# Patient Record
Sex: FEMALE | Race: BLACK OR AFRICAN AMERICAN | HISPANIC OR LATINO | Employment: FULL TIME | URBAN - METROPOLITAN AREA
[De-identification: names, ages, dates, MRNs, and addresses within clinical notes are randomized per-mention and may not be internally consistent; named-entity substitution may affect disease eponyms.]

---

## 2019-01-01 ENCOUNTER — COMMUNICATION - HEALTHEAST (OUTPATIENT)
Dept: SCHEDULING | Facility: CLINIC | Age: 29
End: 2019-01-01

## 2020-01-09 PROCEDURE — 99285 EMERGENCY DEPT VISIT HI MDM: CPT | Mod: 25

## 2020-01-09 PROCEDURE — 96374 THER/PROPH/DIAG INJ IV PUSH: CPT

## 2020-01-09 RX ORDER — PRENATAL VIT/IRON FUM/FOLIC AC 27MG-0.8MG
1 TABLET ORAL DAILY
COMMUNITY

## 2020-01-10 ENCOUNTER — APPOINTMENT (OUTPATIENT)
Dept: ULTRASOUND IMAGING | Facility: CLINIC | Age: 30
End: 2020-01-10
Attending: EMERGENCY MEDICINE
Payer: COMMERCIAL

## 2020-01-10 ENCOUNTER — HOSPITAL ENCOUNTER (EMERGENCY)
Facility: CLINIC | Age: 30
Discharge: HOME | End: 2020-01-10
Attending: EMERGENCY MEDICINE | Admitting: EMERGENCY MEDICINE
Payer: COMMERCIAL

## 2020-01-10 VITALS
OXYGEN SATURATION: 100 % | RESPIRATION RATE: 16 BRPM | WEIGHT: 159.61 LBS | TEMPERATURE: 100.3 F | HEART RATE: 60 BPM | SYSTOLIC BLOOD PRESSURE: 131 MMHG | DIASTOLIC BLOOD PRESSURE: 86 MMHG

## 2020-01-10 DIAGNOSIS — B96.89 BACTERIAL VAGINOSIS: ICD-10-CM

## 2020-01-10 DIAGNOSIS — N76.0 BACTERIAL VAGINOSIS: ICD-10-CM

## 2020-01-10 DIAGNOSIS — R10.30 LOWER ABDOMINAL PAIN: ICD-10-CM

## 2020-01-10 DIAGNOSIS — O20.9 BLEEDING IN EARLY PREGNANCY: ICD-10-CM

## 2020-01-10 DIAGNOSIS — J06.9 UPPER RESPIRATORY TRACT INFECTION, UNSPECIFIED TYPE: ICD-10-CM

## 2020-01-10 LAB
ABO + RH BLD: NORMAL
ABO + RH BLD: NORMAL
ALBUMIN UR-MCNC: 30 MG/DL
APPEARANCE UR: CLEAR
B-HCG SERPL-ACNC: 383 IU/L (ref 0–5)
BASOPHILS # BLD AUTO: 0.1 10E9/L (ref 0–0.2)
BASOPHILS NFR BLD AUTO: 0.6 %
BILIRUB UR QL STRIP: NEGATIVE
COLOR UR AUTO: YELLOW
DIFFERENTIAL METHOD BLD: ABNORMAL
EOSINOPHIL # BLD AUTO: 0.4 10E9/L (ref 0–0.7)
EOSINOPHIL NFR BLD AUTO: 2.7 %
ERYTHROCYTE [DISTWIDTH] IN BLOOD BY AUTOMATED COUNT: 13 % (ref 10–15)
GLUCOSE UR STRIP-MCNC: NEGATIVE MG/DL
HCT VFR BLD AUTO: 37.4 % (ref 35–47)
HGB BLD-MCNC: 12.6 G/DL (ref 11.7–15.7)
HGB UR QL STRIP: ABNORMAL
IMM GRANULOCYTES # BLD: 0 10E9/L (ref 0–0.4)
IMM GRANULOCYTES NFR BLD: 0.2 %
KETONES UR STRIP-MCNC: 60 MG/DL
LEUKOCYTE ESTERASE UR QL STRIP: ABNORMAL
LYMPHOCYTES # BLD AUTO: 2.5 10E9/L (ref 0.8–5.3)
LYMPHOCYTES NFR BLD AUTO: 19.3 %
MCH RBC QN AUTO: 30.6 PG (ref 26.5–33)
MCHC RBC AUTO-ENTMCNC: 33.7 G/DL (ref 31.5–36.5)
MCV RBC AUTO: 91 FL (ref 78–100)
MONOCYTES # BLD AUTO: 0.6 10E9/L (ref 0–1.3)
MONOCYTES NFR BLD AUTO: 4.7 %
MUCOUS THREADS #/AREA URNS LPF: PRESENT /LPF
NEUTROPHILS # BLD AUTO: 9.5 10E9/L (ref 1.6–8.3)
NEUTROPHILS NFR BLD AUTO: 72.5 %
NITRATE UR QL: NEGATIVE
NRBC # BLD AUTO: 0 10*3/UL
NRBC BLD AUTO-RTO: 0 /100
PH UR STRIP: 6 PH (ref 5–7)
PLATELET # BLD AUTO: 275 10E9/L (ref 150–450)
RBC # BLD AUTO: 4.12 10E12/L (ref 3.8–5.2)
RBC #/AREA URNS AUTO: 7 /HPF (ref 0–2)
SOURCE: ABNORMAL
SP GR UR STRIP: 1.03 (ref 1–1.03)
SPECIMEN EXP DATE BLD: NORMAL
SPECIMEN SOURCE: ABNORMAL
SQUAMOUS #/AREA URNS AUTO: 6 /HPF (ref 0–1)
UROBILINOGEN UR STRIP-MCNC: 2 MG/DL (ref 0–2)
WBC # BLD AUTO: 13.1 10E9/L (ref 4–11)
WBC #/AREA URNS AUTO: 5 /HPF (ref 0–5)
WET PREP SPEC: ABNORMAL

## 2020-01-10 PROCEDURE — 81001 URINALYSIS AUTO W/SCOPE: CPT | Performed by: EMERGENCY MEDICINE

## 2020-01-10 PROCEDURE — 76817 TRANSVAGINAL US OBSTETRIC: CPT

## 2020-01-10 PROCEDURE — 96374 THER/PROPH/DIAG INJ IV PUSH: CPT

## 2020-01-10 PROCEDURE — 87491 CHLMYD TRACH DNA AMP PROBE: CPT | Performed by: EMERGENCY MEDICINE

## 2020-01-10 PROCEDURE — 85025 COMPLETE CBC W/AUTO DIFF WBC: CPT | Performed by: EMERGENCY MEDICINE

## 2020-01-10 PROCEDURE — 87210 SMEAR WET MOUNT SALINE/INK: CPT | Performed by: EMERGENCY MEDICINE

## 2020-01-10 PROCEDURE — 25000128 H RX IP 250 OP 636

## 2020-01-10 PROCEDURE — 84702 CHORIONIC GONADOTROPIN TEST: CPT | Performed by: EMERGENCY MEDICINE

## 2020-01-10 PROCEDURE — 86901 BLOOD TYPING SEROLOGIC RH(D): CPT | Performed by: EMERGENCY MEDICINE

## 2020-01-10 PROCEDURE — 25000132 ZZH RX MED GY IP 250 OP 250 PS 637: Performed by: EMERGENCY MEDICINE

## 2020-01-10 PROCEDURE — 25000128 H RX IP 250 OP 636: Performed by: EMERGENCY MEDICINE

## 2020-01-10 PROCEDURE — 87591 N.GONORRHOEAE DNA AMP PROB: CPT | Performed by: EMERGENCY MEDICINE

## 2020-01-10 RX ORDER — METRONIDAZOLE 500 MG/1
500 TABLET ORAL 2 TIMES DAILY
Qty: 14 TABLET | Refills: 0 | Status: SHIPPED | OUTPATIENT
Start: 2020-01-10 | End: 2020-02-14

## 2020-01-10 RX ORDER — ONDANSETRON 2 MG/ML
INJECTION INTRAMUSCULAR; INTRAVENOUS
Status: COMPLETED
Start: 2020-01-10 | End: 2020-01-10

## 2020-01-10 RX ORDER — ONDANSETRON 2 MG/ML
4 INJECTION INTRAMUSCULAR; INTRAVENOUS ONCE
Status: COMPLETED | OUTPATIENT
Start: 2020-01-10 | End: 2020-01-10

## 2020-01-10 RX ORDER — ONDANSETRON 4 MG/1
4 TABLET, ORALLY DISINTEGRATING ORAL ONCE
Status: COMPLETED | OUTPATIENT
Start: 2020-01-10 | End: 2020-01-10

## 2020-01-10 RX ORDER — ACETAMINOPHEN 500 MG
1000 TABLET ORAL EVERY 4 HOURS PRN
Status: DISCONTINUED | OUTPATIENT
Start: 2020-01-10 | End: 2020-01-10 | Stop reason: HOSPADM

## 2020-01-10 RX ADMIN — ONDANSETRON HYDROCHLORIDE 4 MG: 2 INJECTION, SOLUTION INTRAMUSCULAR; INTRAVENOUS at 02:48

## 2020-01-10 RX ADMIN — ACETAMINOPHEN 1000 MG: 500 TABLET, FILM COATED ORAL at 01:43

## 2020-01-10 RX ADMIN — ONDANSETRON 4 MG: 4 TABLET, ORALLY DISINTEGRATING ORAL at 02:14

## 2020-01-10 RX ADMIN — ONDANSETRON 4 MG: 2 INJECTION INTRAMUSCULAR; INTRAVENOUS at 02:48

## 2020-01-10 ASSESSMENT — ENCOUNTER SYMPTOMS
DYSURIA: 0
DIFFICULTY URINATING: 0
FREQUENCY: 0
ABDOMINAL PAIN: 1

## 2020-01-10 NOTE — ED NOTES
Pt actively vomiting on arrival to the ED room. Requesting antiemetic prior to ultrasound. MD notified and verbal order given. Pt A&Ox4, denies further needs at this time. Updated on plan of care.

## 2020-01-10 NOTE — ED PROVIDER NOTES
Reevaluation Per Dr Hurt after US    US pelvis- IMPRESSION:  1. No products of conception are visualized in the endometrial cavity. In the setting of a positive pregnancy test, the differential diagnosis includes early intrauterine pregnancy, spontaneous , and ectopic pregnancy. Recommend correlation with   beta-hCG levels and follow-up ultrasound as clinically indicated.  2. A small amount of nonspecific free fluid in the pelvis.  3. Several small follicles are present in the periphery of the ovaries. This could be physiologic, but can also be seen polycystic ovarian syndrome.    Discussed results and need for follow up to have HCG repeated.    Disposition: home    (O20.9) Bleeding in early pregnancy  :     (N76.0,  B96.89) Bacterial vaginosis    (J06.9) Upper respiratory tract infection, unspecified type      (R10.30) Lower abdominal pain       Noam Bustillos MD  01/10/20 0324

## 2020-01-10 NOTE — ED AVS SNAPSHOT
[facility] Emergency Department  [address]  [facility] [address]  Phone:  [phone]  Fax:  [phone]                                    Andressa Whitt   MRN: [id number]    Department:  [facility] Emergency Department   Date of Visit:  1/9/2020           After Visit Summary Signature Page    I have received my discharge instructions, and my questions have been answered. I have discussed any challenges I see with this plan with the nurse or doctor.    ..........................................................................................................................................  Patient/Patient Representative Signature      ..........................................................................................................................................  Patient Representative Print Name and Relationship to Patient    ..................................................               ................................................  Date                                   Time    ..........................................................................................................................................  Reviewed by Signature/Title    ...................................................              ..............................................  Date                                               Time          22EPIC Rev 08/18

## 2020-01-10 NOTE — ED PROVIDER NOTES
History     Chief Complaint:  Vaginal Bleeding    HPI  Andressa Whitt is a 29 year old  2 week pregnant female who presents to the emergency department today for evaluation of vaginal bleeding and cramping. Her cramping was 10/10 a few hours ago but has now improved. The patient states her bleeding was similar to a period but without any blood clots. She has been ill with the flu over the last 5 days with a cough and congestion. She denies any urinary symptoms or any vaginal discharge.       Allergies:  No known drug allergies    Medications:    Pre-mo vitamins    Past Medical History:    Asthma    Past Surgical History:    orthopedic surgery  appendectomy    Family History:    History reviewed. No pertinent family history.     Social History:  The patient reports that she has never smoked. She has never used smokeless tobacco. She reports previous alcohol use. She reports that she does not use drugs.   PCP: No primary care provider on file.  Marital Status:       Review of Systems   Gastrointestinal: Positive for abdominal pain.   Genitourinary: Positive for vaginal bleeding. Negative for difficulty urinating, dysuria, frequency and vaginal discharge.   All other systems reviewed and are negative.      Physical Exam     Patient Vitals for the past 24 hrs:   BP Temp Temp src Pulse Heart Rate Resp SpO2 Weight   01/10/20 0115 -- -- -- -- -- -- 98 % --   01/10/20 0100 (!) 127/98 -- -- 66 -- -- 98 % --   01/10/20 0030 (!) 142/93 -- -- 61 -- -- 99 % --   20 2356 139/88 100.3  F (37.9  C) Temporal -- 69 18 93 % 72.4 kg (159 lb 9.8 oz)     Physical Exam  Vital signs and nursing notes reviewed.     Constitutional: laying on gurney appears comfortable  HENT: Oropharynx is clear and moist. No pharyngeal erythema.   Eyes: Conjunctivae are normal bilaterally. Pupils equal  Neck: normal range of motion  Cardiovascular: Normal rate, regular rhythm, normal heart sounds.   Pulmonary/Chest: Effort normal and breath  sounds normal. Lungs are clear with no respiratory distress.   Abdominal: Soft. Bowel sounds are normal. No rebound or guarding. Discomfort across the low abdomen to palpation no upper abdominal pain.   Pelvic Exam: External genitalia appears normal. Cervix is closed, there is a scant amount of blood noted in the vaginal vault. No tissue noted. No discharge.   Musculoskeletal: No joint swelling or edema.   Neurological: Alert and oriented. No focal weakness  Skin: Skin is warm and dry. No rash noted.   Psych: normal affect    Emergency Department Course     Imaging:  Radiology findings were communicated with the patient who voiced understanding of the findings.    OB US 1st trimestor  OB  US 1st trimester w transvag    (Results Pending)   Reading per radiology    Laboratory:  Laboratory findings were communicated with the patient who voiced understanding of the findings.    CBC: WBC 13.1 (H) o/w WNL. ( HGB 12.6, )     Wet Prep: Clue cells seen (A)  HCG Quantitative: 383 (H)  Rh type Rh positive  Chlamydia in progress  gonorrhea in progress    UA: urineketon 60 (H), blood large, leukocyte trace, RBC/HPF 7 (H), squamous 6 (H), mucus present, o/w Negative    Interventions:  0143 Tylenol 1000 mg PO  0214 Zofran 4mg PO    Emergency Department Course:  Past medical records, nursing notes, and vitals reviewed.  0036: I performed an exam of the patient and obtained history, as documented above.     IV was inserted and blood was drawn for laboratory testing, results above.  The patient provided a urine sample here in the emergency department. This was sent for laboratory testing, findings above.  The patient was sent for a US while in the emergency department, findings above.    I personally reviewed the laboratory/imaging results with the Patient and spouse and answered all related questions prior to sign out    0225: I rechecked the patient.  Findings and plan explained to the Patient and spouse. Per Dr. Bustillos  after results of the ultrasounds the patient will be discharged home with instructions regarding supportive care, medications, and reasons to return. The importance of close follow-up was reviewed.     Impression & Plan      Medical Decision Making:  Andressa Whitt is a 29 year old  2 weeks pregnant by date female who presents to the emergency department today for evaluation of lower abdominal cramping and vaginal bleeding in early pregnancy. It was noted that she did have a low grade temperature as well on arrival but admits that she has had flu like symptoms starting 5 days ago which are resolving. On exam she has no signs of significant upper respiratory symptoms or pneumonia. Pelvic exam revealed a closed cervix with a very scant amount of bleeding and no obvious past tissue noted. Initial labs test showed and HCG level of 383. Wet prep did show Clue cells and I plan to start the patient on Flagyl. White count is mildly elevated but this is likely due to an upper respiratory infection. Patient states she is approximately 2 weeks pregnant and it is unlikely we would see much with ultrasound but this is ordered and currently pending. I discussed the patient with my partner Dr. Bustillos who will follow up on the results and plan for disposition home. I discussed with the patient that ultrasound if it is not definitive especially this early in gestation it is important that she is aware that with pain and bleeding in early pregnancy that an ectopic pregnancy cannot be excluded and that she needs close follow up to follow her HCG levels. She was given a referral to OB/GYN or she can come back to the ED for further evaluation and recheck of this if she cannot get into clinic. The patient understands the plan and ending the ultrasound results will likely be able to be discharged. The patient is Rh positive and does not require Kulwant.     Diagnosis:    ICD-10-CM   1. Bleeding in early pregnancy O20.9   2. Bacterial  vaginosis N76.0    B96.89   3. Upper respiratory tract infection, unspecified type J06.9      4. Lower abdominal pain R10.30       Disposition:   patient was signed out to my colleague Dr. Bustillos who will follow up on US.       Discharge Medications:     Medication List      Started    metroNIDAZOLE 500 MG tablet  Commonly known as:  FLAGYL  500 mg, Oral, 2 TIMES DAILY            Scribe Disclosure:  Crystal GARCIA, am serving as a scribe at 12:36 AM on 1/10/2020 to document services personally performed by Ho Hurt MD based on my observations and the provider's statements to me.    [facility] EMERGENCY DEPARTMENT       Ho Hurt MD  01/13/20 1735

## 2020-01-10 NOTE — ED TRIAGE NOTES
Started bleeding vaginally PTA, just positive pregnancy test 2 days ago also c/o cramping ABC intact

## 2020-01-12 LAB
C TRACH DNA SPEC QL NAA+PROBE: NEGATIVE
N GONORRHOEA DNA SPEC QL NAA+PROBE: NEGATIVE
SPECIMEN SOURCE: NORMAL
SPECIMEN SOURCE: NORMAL

## 2020-01-12 NOTE — RESULT ENCOUNTER NOTE
Final result for both N. Gonorrhoeae PCR and Chlamydia Trachomatis PCR are NEGATIVE.  No treatment or change in treatment per [facility] ED Lab Result protocol.

## 2020-01-30 ENCOUNTER — APPOINTMENT (OUTPATIENT)
Dept: ULTRASOUND IMAGING | Facility: CLINIC | Age: 30
End: 2020-01-30
Attending: EMERGENCY MEDICINE
Payer: COMMERCIAL

## 2020-01-30 ENCOUNTER — HOSPITAL ENCOUNTER (EMERGENCY)
Facility: CLINIC | Age: 30
Discharge: HOME | End: 2020-01-30
Attending: EMERGENCY MEDICINE | Admitting: EMERGENCY MEDICINE
Payer: COMMERCIAL

## 2020-01-30 VITALS
SYSTOLIC BLOOD PRESSURE: 103 MMHG | HEART RATE: 73 BPM | BODY MASS INDEX: 28.3 KG/M2 | OXYGEN SATURATION: 99 % | DIASTOLIC BLOOD PRESSURE: 76 MMHG | RESPIRATION RATE: 20 BRPM | WEIGHT: 149.91 LBS | HEIGHT: 61 IN | TEMPERATURE: 96.9 F

## 2020-01-30 DIAGNOSIS — O00.90 ECTOPIC PREGNANCY, UNSPECIFIED LOCATION, UNSPECIFIED WHETHER INTRAUTERINE PREGNANCY PRESENT: ICD-10-CM

## 2020-01-30 LAB
ALBUMIN SERPL-MCNC: 4.1 G/DL (ref 3.4–5)
ALBUMIN UR-MCNC: NEGATIVE MG/DL
ALP SERPL-CCNC: 68 U/L (ref 40–150)
ALT SERPL W P-5'-P-CCNC: 23 U/L (ref 0–50)
ANION GAP SERPL CALCULATED.3IONS-SCNC: 3 MMOL/L (ref 3–14)
APPEARANCE UR: CLEAR
AST SERPL W P-5'-P-CCNC: 11 U/L (ref 0–45)
B-HCG SERPL-ACNC: 1857 IU/L (ref 0–5)
BASOPHILS # BLD AUTO: 0.1 10E9/L (ref 0–0.2)
BASOPHILS NFR BLD AUTO: 0.7 %
BILIRUB SERPL-MCNC: 0.5 MG/DL (ref 0.2–1.3)
BILIRUB UR QL STRIP: NEGATIVE
BUN SERPL-MCNC: 20 MG/DL (ref 7–30)
CALCIUM SERPL-MCNC: 9.2 MG/DL (ref 8.5–10.1)
CHLORIDE SERPL-SCNC: 104 MMOL/L (ref 94–109)
CO2 SERPL-SCNC: 30 MMOL/L (ref 20–32)
COLOR UR AUTO: ABNORMAL
CREAT SERPL-MCNC: 0.67 MG/DL (ref 0.52–1.04)
DIFFERENTIAL METHOD BLD: NORMAL
EOSINOPHIL # BLD AUTO: 0.1 10E9/L (ref 0–0.7)
EOSINOPHIL NFR BLD AUTO: 1.5 %
ERYTHROCYTE [DISTWIDTH] IN BLOOD BY AUTOMATED COUNT: 13.1 % (ref 10–15)
GFR SERPL CREATININE-BSD FRML MDRD: >90 ML/MIN/{1.73_M2}
GLUCOSE SERPL-MCNC: 91 MG/DL (ref 70–99)
GLUCOSE UR STRIP-MCNC: NEGATIVE MG/DL
HCT VFR BLD AUTO: 38.5 % (ref 35–47)
HGB BLD-MCNC: 12.6 G/DL (ref 11.7–15.7)
HGB UR QL STRIP: ABNORMAL
IMM GRANULOCYTES # BLD: 0 10E9/L (ref 0–0.4)
IMM GRANULOCYTES NFR BLD: 0.3 %
KETONES UR STRIP-MCNC: NEGATIVE MG/DL
LEUKOCYTE ESTERASE UR QL STRIP: NEGATIVE
LYMPHOCYTES # BLD AUTO: 2.4 10E9/L (ref 0.8–5.3)
LYMPHOCYTES NFR BLD AUTO: 32 %
MCH RBC QN AUTO: 30.1 PG (ref 26.5–33)
MCHC RBC AUTO-ENTMCNC: 32.7 G/DL (ref 31.5–36.5)
MCV RBC AUTO: 92 FL (ref 78–100)
MONOCYTES # BLD AUTO: 0.5 10E9/L (ref 0–1.3)
MONOCYTES NFR BLD AUTO: 6.5 %
MUCOUS THREADS #/AREA URNS LPF: PRESENT /LPF
NEUTROPHILS # BLD AUTO: 4.4 10E9/L (ref 1.6–8.3)
NEUTROPHILS NFR BLD AUTO: 59 %
NITRATE UR QL: NEGATIVE
NRBC # BLD AUTO: 0 10*3/UL
NRBC BLD AUTO-RTO: 0 /100
PH UR STRIP: 6 PH (ref 5–7)
PLATELET # BLD AUTO: 286 10E9/L (ref 150–450)
POTASSIUM SERPL-SCNC: 3.6 MMOL/L (ref 3.4–5.3)
PROT SERPL-MCNC: 7.8 G/DL (ref 6.8–8.8)
RADIOLOGIST FLAGS: ABNORMAL
RBC # BLD AUTO: 4.18 10E12/L (ref 3.8–5.2)
RBC #/AREA URNS AUTO: 60 /HPF (ref 0–2)
SODIUM SERPL-SCNC: 137 MMOL/L (ref 133–144)
SOURCE: ABNORMAL
SP GR UR STRIP: 1.03 (ref 1–1.03)
SQUAMOUS #/AREA URNS AUTO: 6 /HPF (ref 0–1)
UROBILINOGEN UR STRIP-MCNC: NORMAL MG/DL (ref 0–2)
WBC # BLD AUTO: 7.4 10E9/L (ref 4–11)
WBC #/AREA URNS AUTO: 3 /HPF (ref 0–5)

## 2020-01-30 PROCEDURE — 96401 CHEMO ANTI-NEOPL SQ/IM: CPT

## 2020-01-30 PROCEDURE — 96372 THER/PROPH/DIAG INJ SC/IM: CPT

## 2020-01-30 PROCEDURE — 76801 OB US < 14 WKS SINGLE FETUS: CPT

## 2020-01-30 PROCEDURE — 25000132 ZZH RX MED GY IP 250 OP 250 PS 637: Performed by: EMERGENCY MEDICINE

## 2020-01-30 PROCEDURE — 96361 HYDRATE IV INFUSION ADD-ON: CPT

## 2020-01-30 PROCEDURE — 80053 COMPREHEN METABOLIC PANEL: CPT | Performed by: EMERGENCY MEDICINE

## 2020-01-30 PROCEDURE — 85025 COMPLETE CBC W/AUTO DIFF WBC: CPT | Performed by: EMERGENCY MEDICINE

## 2020-01-30 PROCEDURE — 81001 URINALYSIS AUTO W/SCOPE: CPT | Performed by: EMERGENCY MEDICINE

## 2020-01-30 PROCEDURE — 99284 EMERGENCY DEPT VISIT MOD MDM: CPT | Mod: 25

## 2020-01-30 PROCEDURE — 25800030 ZZH RX IP 258 OP 636: Performed by: EMERGENCY MEDICINE

## 2020-01-30 PROCEDURE — 96374 THER/PROPH/DIAG INJ IV PUSH: CPT

## 2020-01-30 PROCEDURE — 25000128 H RX IP 250 OP 636: Performed by: EMERGENCY MEDICINE

## 2020-01-30 PROCEDURE — 84702 CHORIONIC GONADOTROPIN TEST: CPT | Performed by: EMERGENCY MEDICINE

## 2020-01-30 RX ORDER — METHOTREXATE 25 MG/ML
50 INJECTION INTRA-ARTERIAL; INTRAMUSCULAR; INTRATHECAL; INTRAVENOUS ONCE
Status: COMPLETED | OUTPATIENT
Start: 2020-01-30 | End: 2020-01-30

## 2020-01-30 RX ORDER — ONDANSETRON 4 MG/1
4 TABLET, ORALLY DISINTEGRATING ORAL EVERY 8 HOURS PRN
Qty: 10 TABLET | Refills: 0 | Status: SHIPPED | OUTPATIENT
Start: 2020-01-30 | End: 2020-02-14

## 2020-01-30 RX ORDER — ACETAMINOPHEN 325 MG/1
650 TABLET ORAL ONCE
Status: COMPLETED | OUTPATIENT
Start: 2020-01-30 | End: 2020-01-30

## 2020-01-30 RX ORDER — HYDROCODONE BITARTRATE AND ACETAMINOPHEN 5; 325 MG/1; MG/1
1 TABLET ORAL EVERY 6 HOURS PRN
Qty: 12 TABLET | Refills: 0 | Status: SHIPPED | OUTPATIENT
Start: 2020-01-30 | End: 2020-02-14

## 2020-01-30 RX ORDER — ONDANSETRON 2 MG/ML
4 INJECTION INTRAMUSCULAR; INTRAVENOUS ONCE
Status: COMPLETED | OUTPATIENT
Start: 2020-01-30 | End: 2020-01-30

## 2020-01-30 RX ORDER — POLYETHYLENE GLYCOL 3350 17 G/17G
1 POWDER, FOR SOLUTION ORAL DAILY
Qty: 527 G | Refills: 0 | Status: SHIPPED | OUTPATIENT
Start: 2020-01-30 | End: 2020-02-29

## 2020-01-30 RX ORDER — ASPIRIN 81 MG
100 TABLET, DELAYED RELEASE (ENTERIC COATED) ORAL DAILY
Qty: 10 TABLET | Refills: 0 | Status: SHIPPED | OUTPATIENT
Start: 2020-01-30

## 2020-01-30 RX ADMIN — METHOTREXATE 86 MG: 25 SOLUTION INTRA-ARTERIAL; INTRAMUSCULAR; INTRATHECAL; INTRAVENOUS at 19:30

## 2020-01-30 RX ADMIN — ONDANSETRON HYDROCHLORIDE 4 MG: 2 INJECTION, SOLUTION INTRAMUSCULAR; INTRAVENOUS at 14:37

## 2020-01-30 RX ADMIN — SODIUM CHLORIDE 500 ML: 9 INJECTION, SOLUTION INTRAVENOUS at 14:37

## 2020-01-30 RX ADMIN — ACETAMINOPHEN 650 MG: 325 TABLET, FILM COATED ORAL at 14:37

## 2020-01-30 ASSESSMENT — ENCOUNTER SYMPTOMS
CONSTIPATION: 1
VOMITING: 1
FEVER: 0
DYSURIA: 0
ABDOMINAL PAIN: 1

## 2020-01-30 ASSESSMENT — MIFFLIN-ST. JEOR: SCORE: 1342.76

## 2020-01-30 NOTE — H&P
2020      **PLEASE SEE DICTATED CONSULT NOTE FOR FURTHER DETAILS**    28yo  AAF @ approx 5.1 wga by LMP (19) presents to the ER for worsening RLQ/pelvic pain, spotting.  Of note, she has had pain for the past 2 weeks however today at work it got worse which prompted her visit.  She has had spotting as well.  She was evaluated in the ER on 1/10 and her bHCG at that time was 383.  Today her bHCG is 1857.  Pelvic US today shows no IUP, with ET of only ~ 3.5 mm, and a 3.9 cm complex cyst/mass in R adnexa adjacent to R ovary, with small amount of fluid in pelvis, suspicious for ectopic pregnancy.  The pt is otherwise hemodynamically stable, abdomen is soft with moderate ttp RLQ, no rebound, Hgb 12.6 with normal LFTs and BUN/Cr.  After discussion with pt, decision made to proceed with methotrexate 50 mg/m2 IM.  Stressed to pt importance of close follow-up with repeat bHCG on , and bHCG + LFTs + BUN/Cr on 20.  Risks/benefits/side effects of methotrexate were reviewed, as well as risk of worsening pain this weekend.  Reasons to call/return to ED, and activity restrictions reviewed.  All questions answered.  Pt does not have an OBGYN doctor, and she is welcome to follow-up with [facility] next week on .        LEVAR DURAN MD

## 2020-01-30 NOTE — ED PROVIDER NOTES
"  History     Chief Complaint:  Right Abdominal Pain; Vaginal bleeding    The history is provided by the patient and medical records.      Andressa Whitt is a 29 year old female  with a history of asthma who presents to the emergency department for evaluation of abdominal pain and vaginal bleeding. Patient's last menstrual cycle was over a month ago (19) and she had a positive pregnancy home test about four weeks ago.     Two weeks ago, the patient was seen in the ED for vaginal bleeding and abdominal cramping. She had blood work and an ultrasound done, see workup below. Since then, the patient's bleeding and right sided abdominal pain has not subsided. Patient states she is going through two pads a day but is not passing any clots and reports bleeding more as \"spotting.\" Today, the patient had to leave work early because her cramping/abdominal pain was so severe, which prompted her arrival today. She notes she has tried taking medication for her pain but throws it up \"immediately.\" She also complains of constipation with pain when passing stool.     Patient denies any fever, vaginal discharge, lightheadedness or dysuria. Patient notes she has her first OB appointment on 2020.    Work-up from 1/10/2020:  CBC: WBC 13.1 (H) o/w WNL. ( HGB 12.6, )      Wet Prep: Clue cells seen (A)  HCG Quantitative: 383 (H)  Rh type Rh positive  Chlamydia: Negative   Gonorrhea: Negative      UA: Ketones: 60 (H), Blood: Large, Leukocyte: Rrace, RBC: 7 (H), Squamous: 6 (H), Mucus: Present, o/w Negative    OB US 1st Trimester with transvaginal:  1. No products of conception are visualized in the endometrial cavity. In the setting of a positive pregnancy test, the differential diagnosis includes early intrauterine pregnancy, spontaneous , and ectopic pregnancy. Recommend correlation with  beta-hCG levels and follow-up ultrasound as clinically indicated.  2. A small amount of nonspecific free fluid in the " "pelvis.  3. Several small follicles are present in the periphery of the ovaries. This could be physiologic, but can also be seen polycystic ovarian syndrome. As per radiology     Allergies:  No Known Drug Allergies     Medications:    Pre- vitamins    Past Medical History:    Asthma    Past Surgical History:    Appendectomy  Orthopedic surgery     Family History:    No past pertinent family history.    Social History:  Negative for tobacco use.  Not currently drinking alcohol.  Negative for drug use.  Marital Status:        Review of Systems   Constitutional: Negative for fever.   Gastrointestinal: Positive for abdominal pain, constipation and vomiting.   Genitourinary: Positive for vaginal bleeding. Negative for dysuria and vaginal discharge.   All other systems reviewed and are negative.    Physical Exam     Patient Vitals for the past 24 hrs:   BP Temp Temp src Pulse Resp SpO2 Height Weight   20 1707 -- -- -- -- -- -- 1.55 m (5' 1.02\") 68 kg (149 lb 14.6 oz)   20 1409 116/86 96.9  F (36.1  C) Temporal 67 20 98 % -- --       Physical Exam  Nursing note and vitals reviewed.  Constitutional: Well nourished. Resting comfortably.   Eyes: Conjunctiva normal.  Pupils are equal, round, and reactive to light.   ENT: Nose normal. Mucous membranes pink and moist.    Neck: Normal range of motion.  CVS: Normal rate, regular rhythm.  Normal heart sounds.  No murmur.  Pulmonary: Lungs clear to auscultation bilaterally. No wheezes/rales/rhonchi.  GI: Abdomen soft. Mild suprapubic pain. No rigidity or guarding.  No CVA tendernss  Pelvic: deferred by patient  MSK: No calf tenderness or swelling.  Neuro: Alert. Follows simple commands.  Skin: Skin is warm and dry. No rash noted.   Psychiatric: Normal affect.     Emergency Department Course   Imaging:  Radiology findings were communicated with the patient and OB who voiced understanding of the findings.    US OB <14 Weeks with Transvaginal:  1. No " intrauterine pregnancy is identified.   2. A 3.9 cm right adnexal mass containing a central cystic region is considered suspicious for an ectopic pregnancy.  3. Small amount of complex free fluid in the pelvis is suspicious for the presence of blood products. As per radiology.     Laboratory:  Laboratory findings were communicated with the patient who voiced understanding of the findings.    CBC: AWNL; WBC: 7.4, HGB: 12.6, PLT: 286  CMP: WNL (Creatinine: 0.67)    HCG Quantitative Pregnancy: 1,857 (H)    UA with Microscopic: Blood: Moderate, RBC: 60 (H), Squamous Epithelial: 6 (H), Mucous: Present, o/w Negative    Interventions:  1437 NS 1L IV  1437 Tylenol 650 mg PO  1437 Zofran 4 mg IV  1930 Methotrexate 86 mg IM    Emergency Department Course:  Past medical records, nursing notes, and vitals reviewed.    1415 I performed an exam of the patient as documented above.     IV was inserted and blood was drawn for laboratory testing, results above.  The patient provided a urine sample here in the emergency department. This was sent for laboratory testing, findings above.  The patient was sent for a OB US while in the emergency department, results above.     1606 I consulted with Dr. Pitts, radiology, regarding the patient's history and presentation here in the emergency department.    1619 I consulted with Dr. Hernandez, OB, regarding the patient's history and presentation here in the emergency department.    1625 I rechecked the patient and discussed the results of her workup thus far.     1656 I consulted with Dr. Hernandez, OB, regarding the patient's history and presentation here in the emergency department.    Findings and plan explained to the Patient. Patient discharged home with instructions regarding supportive care, medications, and reasons to return. The importance of close follow-up was reviewed. The patient was prescribed Colace, Zofran, Miralax, and Hydrocodone.    I personally reviewed the laboratory and  imaging results with the Patient and answered all related questions prior to discharge.     Impression & Plan   Medical Decision Making:  Patient is a 29-year-old female presenting in early pregnancy with abdominal pain and vaginal bleeding.  She has nontoxic, clinically well hydrated on arrival.  She has no significant reproducible tenderness other than mild suprapubic tenderness.  She deferred formal pelvic exam today.  I reviewed patient's wet prep from last visit as well as her gonorrhea and chlamydia which were negative.  She denies any concerns for STIs today.  The patient is not a RhoGam candidate based on previous Rhogam evaluation.  Her hemoglobin is stable today and I doubt life-threatening bleeding.  UA without infection.  She did undergo a repeat pelvic ultrasound which suggest concern for ectopic pregnancy.  Patient was evaluated by OB team during her time in the ED who recommended methotrexate and consented patient for administration.  This was administered during her time in the ED.  Plans for repeat beta-hCG on Sunday and plans for repeat evaluation with lab work/OB/GYN follow-up on Wednesday.  Patient was counseled on return precautions to the ED including but not limited to bleeding greater than 1 pad an hour, lightheadedness, increasing abdominal pain.  Patient comfortable with plan of care at this time, all questions addressed.  Patient hemodynamically stable at time of discharge with no significant abdominal pain.  She will additionally be sent home with pain control, norco as well as stool softeners/miralax as patient expressed history of constipation.     Diagnosis:    ICD-10-CM    1. Ectopic pregnancy, unspecified location, unspecified whether intrauterine pregnancy present O00.90        Disposition:  Discharged to home.    Discharge Medications:  New Prescriptions    DOCUSATE SODIUM (COLACE) 100 MG TABLET    Take 1 tablet (100 mg) by mouth daily    HYDROCODONE-ACETAMINOPHEN (NORCO) 5-325 MG  TABLET    Take 1 tablet by mouth every 6 hours as needed for pain    ONDANSETRON (ZOFRAN ODT) 4 MG ODT TAB    Take 1 tablet (4 mg) by mouth every 8 hours as needed for nausea    POLYETHYLENE GLYCOL (MIRALAX) POWDER    Take 17 g (1 capful) by mouth daily       Scribe Disclosure:  I, Lety Pelayo, am serving as a scribe at 2:23 PM on 1/30/2020 to document services personally performed by Eileen Cintron DO, based on my observations and the provider's statements to me.        Eileen Cintron DO  01/30/20 1939

## 2020-01-30 NOTE — ED TRIAGE NOTES
+ pregnancy test 2 weeks ago.  LMP 12/25/19.  Right side abdominal pain and vaginal bleeding.  ABCDs intact.

## 2020-01-30 NOTE — ED AVS SNAPSHOT
[facility] Department  [address]  [facility] [address]  Phone:  [phone]  Fax:  [phone]                                    Andressa Whitt   MRN: [id number]    Department:  [facility] Department   Date of Visit:  1/30/2020           After Visit Summary Signature Page    I have received my discharge instructions, and my questions have been answered. I have discussed any challenges I see with this plan with the nurse or doctor.    ..........................................................................................................................................  Patient/Patient Representative Signature      ..........................................................................................................................................  Patient Representative Print Name and Relationship to Patient    ..................................................               ................................................  Date                                   Time    ..........................................................................................................................................  Reviewed by Signature/Title    ...................................................              ..............................................  Date                                               Time          22EPIC Rev 08/18

## 2020-01-31 ENCOUNTER — MEDICAL CORRESPONDENCE (OUTPATIENT)
Dept: HEALTH INFORMATION MANAGEMENT | Facility: CLINIC | Age: 30
End: 2020-01-31

## 2020-01-31 DIAGNOSIS — O00.90 ECTOPIC PREGNANCY: Primary | ICD-10-CM

## 2020-01-31 NOTE — CONSULTS
Consult Date:  01/30/2020      REASON FOR CONSULTATION:  Suspected ectopic pregnancy.      HISTORY OF PRESENT ILLNESS:  This is a 29-year-old G3, P1-0-1-1 -American female at approximately 5 weeks and 1 day gestation by LMP of 12/25/2019 who presented to the ER on the afternoon of 01/30/2020 with main complaint of right lower quadrant pelvic pain, as well as vaginal spotting.  Of note, she had been evaluated in the ER on 01/10/2019 for spotting and her beta hCG at that time was 383 with ultrasound showing no intrauterine pregnancy.  For the past 2 weeks since her prior evaluation, she had worsening right lower quadrant pain which prompted her return visit.  Upon arrival to the ER, her repeat beta hCG was found to be 1857 with a pelvic ultrasound showing no intrauterine pregnancy, endometrial thickness of only approximately 2 mm, and a 3.9 cm complex cystic mass in the right adnexa adjacent to the right ovary, suspicious for ectopic pregnancy.  There was only a small amount of complex fluid in the cul-de-sac.  The patient was otherwise hemodynamically stable with hemoglobin of 12.6 in the ER.        PAST MEDICAL HISTORY:  Denies.      PAST OB/GYN HISTORY:  The patient has 1 prior full-term vaginal delivery and 1 prior first trimester SAB.      PAST SURGICAL HISTORY:  Denies.      MEDICATIONS:  Please see MAR.      ALLERGIES:  NO KNOWN DRUG ALLERGIES.      SOCIAL HISTORY:  Denies smoking, alcohol or illicit drug use.      REVIEW OF SYSTEMS:  As per HPI, otherwise negative.      PHYSICAL EXAMINATION:   VITAL SIGNS:  Temperature 96.9, pulse 67, blood pressure 116/86, respiratory rate 20, O2 sat 98% on room air.   GENERAL:  No acute distress, alert and oriented x 3.   CARDIOVASCULAR:  Regular rate and rhythm without murmurs, rubs or gallops.   RESPIRATORY:  Clear to auscultation bilaterally without wheezes, rhonchi or rales.   ABDOMEN:  Soft.  There is mild tenderness to palpation in the right lower quadrant;  however, no guarding and no rebound.   EXTREMITIES:  No clubbing, cyanosis or edema.   GENITOURINARY:  Deferred.      LABORATORY DATA:  CBC revealed WBC 7.4, hemoglobin 12.6, hematocrit 38.5 and platelets of 286.  Metabolic panel revealed sodium of 137, potassium 3.6, chloride 104, carbon dioxide 30, BUN 20, creatinine 0.67, AST 11, ALT 23.  Serum quantitative beta hCG 1857.      IMAGING:  Pelvic ultrasound revealed no intrauterine gestational sac identified with no embryonic pole or cardiac activity.  The endometrial stripe measures 2 mm and within normal limits.  There is a 3.9 x 3.6 x 3.3 cm mass abutting the right ovary containing a central cystic lesion suspicious for ectopic pregnancy and a small amount of complex free fluid in the pelvis.      ASSESSMENT AND PLAN:  The patient is a 29-year-old -0-1-1 -American female at approximately 5 weeks and 1 day gestation by LMP of 2019 who presents to the ER with worsening right lower quadrant and pelvic pain and spotting.  Given abnormal rise in beta hCG from 383 to 1857 over the course of almost 3 weeks as well as pelvic ultrasound showing thin endometrium without evidence of intrauterine pregnancy, as well as a complex cystic mass in the right adnexa separate from the right ovary, this is highly suspicious for a right-sided tubal ectopic pregnancy. The patient is hemodynamically stable with normal hemoglobin and exam with only mild tenderness to palpation in the RLQ without rebound or guarding.  Ultrasound also revealed only a small amount of fluid in the cul-de-sac.  With a complex mass measuring less than 4 cm and a beta hCG of 1857, I believe that the patient is a good candidate for IM methotrexate therapy.  The risks, benefits and side effects of methotrexate were reviewed with the patient as well as the importance of close followup as an outpatient.  The risk of also worsening pain within 2-3 days after administration of methotrexate was also  reviewed with the patient.  The risk of methotrexate failure and/or ectopic rupture was also discussed, which may necessitate surgical management with right salpingectomy.  The patient understood these risks and desired to proceed with methotrexate.  All questions were answered.        Methotrexate 50 kg/m2 IM has been ordered by the ER physician.  The patient was instructed to return  to the outpatient lab for a repeat quantitative beta hCG level and to follow up in our office, [facility], on Wednesday, , for a repeat quantitative beta hCG level, LFTs and renal blood work as well as physician appointment.         LEVAR DURAN MD             D: 2020   T: 2020   MT:       Name:     JASWANT THOMAS   MRN:      [id number]        Account:       [id number]   :      1990           Consult Date:  2020      Document: [id number]

## 2020-02-01 ENCOUNTER — NURSE TRIAGE (OUTPATIENT)
Dept: NURSING | Facility: CLINIC | Age: 30
End: 2020-02-01

## 2020-02-01 NOTE — TELEPHONE ENCOUNTER
Andressa reports having a lot of continuous abdominal pain the last 30 min.     The pain is rated 8/10  It is continuous; not crampy.    She was in the ER yesterday for an ectopic pregnancy.  She received methotrexate IM at that time.    She reports that she has been having right leg numbness and aching.  She received the IM injection in the right buttocks.    ER advised.    Syl Varner RN  [facility]        Reason for Disposition    [1] Recent medical visit within 24 hours AND [2] NEW symptom AND [3] that could be serious    Protocols used: RECENT MEDICAL VISIT FOR ILLNESS FOLLOW-UP CALL-A-

## 2020-02-02 ENCOUNTER — HOSPITAL ENCOUNTER (EMERGENCY)
Facility: CLINIC | Age: 30
Discharge: HOME | End: 2020-02-02
Attending: PHYSICIAN ASSISTANT | Admitting: PHYSICIAN ASSISTANT
Payer: COMMERCIAL

## 2020-02-02 VITALS
WEIGHT: 150.13 LBS | DIASTOLIC BLOOD PRESSURE: 68 MMHG | OXYGEN SATURATION: 97 % | RESPIRATION RATE: 18 BRPM | HEIGHT: 61 IN | HEART RATE: 75 BPM | SYSTOLIC BLOOD PRESSURE: 105 MMHG | BODY MASS INDEX: 28.35 KG/M2 | TEMPERATURE: 97 F

## 2020-02-02 DIAGNOSIS — O00.90 ECTOPIC PREGNANCY, UNSPECIFIED LOCATION, UNSPECIFIED WHETHER INTRAUTERINE PREGNANCY PRESENT: ICD-10-CM

## 2020-02-02 LAB — B-HCG SERPL-ACNC: 1755 IU/L (ref 0–5)

## 2020-02-02 PROCEDURE — 99283 EMERGENCY DEPT VISIT LOW MDM: CPT

## 2020-02-02 PROCEDURE — 84702 CHORIONIC GONADOTROPIN TEST: CPT | Performed by: PHYSICIAN ASSISTANT

## 2020-02-02 PROCEDURE — 36415 COLL VENOUS BLD VENIPUNCTURE: CPT | Performed by: PHYSICIAN ASSISTANT

## 2020-02-02 ASSESSMENT — ENCOUNTER SYMPTOMS: ABDOMINAL PAIN: 1

## 2020-02-02 ASSESSMENT — MIFFLIN-ST. JEOR: SCORE: 1343.38

## 2020-02-02 NOTE — DISCHARGE INSTRUCTIONS
Follow-up with OB on Wednesday as planned.  Return to emergency department at anytime for worsening abdominal pain, fevers, vomiting, faint, or if you like you are to faint, or any other new/concerning symptoms.

## 2020-02-02 NOTE — ED AVS SNAPSHOT
[facility] Emergency Department  [address]  [facility] [address]  Phone:  [phone]  Fax:  [phone]                                    Andressa Whitt   MRN: [id number]    Department:  [facility] Emergency Department   Date of Visit:  2/2/2020           After Visit Summary Signature Page    I have received my discharge instructions, and my questions have been answered. I have discussed any challenges I see with this plan with the nurse or doctor.    ..........................................................................................................................................  Patient/Patient Representative Signature      ..........................................................................................................................................  Patient Representative Print Name and Relationship to Patient    ..................................................               ................................................  Date                                   Time    ..........................................................................................................................................  Reviewed by Signature/Title    ...................................................              ..............................................  Date                                               Time          22EPIC Rev 08/18

## 2020-02-02 NOTE — ED PROVIDER NOTES
"  History     Chief Complaint:    Abnormal Labs      The history is provided by the patient.      Andressa Whitt is a 29 year old female  with a history of asthma who presents for a recheck of beta-hCG in the setting of recently identified ectopic pregnancy. The patient was evaluated in the Emergency Department three days ago for sharp abdominal pain and vaginal bleeding after a positive home pregnancy test about four weeks prior. The patient was evaluated by OB/Gyn at this time and an ectopic pregnancy was identified via ultrasound.  She was instructed to return for repeat beta-hCG today to ensure she did not need surgery.  Now, the patient reports continued vaginal bleeding similar to one of her periods without any clotting. Reports persistent lower abdominal cramping. She was sent home from the Emergency Department with Norco which has been helping to relieve her abdominal pain. She has a follow up with OB/Gyn in three days for repeat evaluation with labs.     From Lab Workup on 2020:  HCG Quantitative Pregnancy: 1,857 (H)    Allergies:  No Known Allergies     Medications:    Colace  [place]  Zofran     Past Medical History:    Asthma     Past Surgical History:    Appendectomy   Orthopedic surgery     Family History:    No past pertinent family history.    Social History:  Presents to the ED with significant other at the bedside  Tobacco Use: no  Alcohol Use: no  Drug Use: no  Marital Status:   [2]     Review of Systems   Gastrointestinal: Positive for abdominal pain.   Genitourinary: Positive for vaginal bleeding.   All other systems reviewed and are negative.      Physical Exam     Patient Vitals for the past 24 hrs:   BP Temp Temp src Pulse Resp SpO2 Height Weight   20 1302 105/68 97  F (36.1  C) Temporal 75 18 97 % 1.549 m (5' 1\") 68.1 kg (150 lb 2.1 oz)       Physical Exam     General: Alert, interactive. Resting comfortably, in no acute distress.   Head:  Scalp is atraumatic.  Eyes:  EOM " intact. The pupils are equal, round, and reactive to light. No scleral icterus.   ENT:                                      Ears:  The external ears are normal.   Nose:  The external nose is normal.  Throat:  The oropharynx is normal. Mucus membranes are moist.                 Neck:  Normal range of motion. There is no rigidity.   CV:  Regular rate and rhythm. No murmur. 2+ radial pulses  Resp:  Breath sounds are clear bilaterally. Non-labored, no retractions or accessory muscle use.  GI:  Abdomen is soft, no distension, mild diffuse lower abdominal tenderness to palpation. No rebound or guarding.   MS:  Normal range of motion.   Skin:  Warm and dry.   Neuro:  Strength and sensation grossly intact.   Psych:  Awake. Alert.  Appropriate interactions.     Emergency Department Course     Laboratory:  Laboratory findings were communicated with the patient who voiced understanding of the findings.    HCG Quantitative Pregnancy (Blood): 1,755 (H)     Emergency Department Course:  Past medical records, nursing notes, and vitals reviewed.    IV was inserted and blood was drawn for laboratory testing, results above.    1359: I performed an exam of the patient as documented above.     Findings and plan explained to the patient. Patient discharged home with instructions regarding supportive care, medications, and reasons to return. The importance of close follow-up was reviewed.     I personally reviewed the laboratory results with the patient and answered all related questions prior to discharge.    Impression & Plan     Medical Decision Making:  Andressa Whitt is a 29 year old female,  female, recently diagnosed with ectopic pregnancy 3 days ago, presents to the emergency department for repeat beta hCG testing. Patient was instructed to return today to ensure HCG trending down. Previous HCG 1857 and today 1755. The patient reports persistent abdominal cramping, though no worsening paint to suggest need for repeat  ultrasound or further workup.  Recommended continuing Norco as needed for pain control and follow up closely with OB as instructed. She understands reasons to return to the ED including worsening abdominal pain, fevers, or any other new/concerning symptoms.       Discharge Diagnosis:    ICD-10-CM    1. Ectopic pregnancy, unspecified location, unspecified whether intrauterine pregnancy present O00.90      Disposition:  Discharged home.     Scribe Disclosure:  I, Irma Farnk, am serving as a scribe at 1:33 PM on 2/2/2020 to document services personally performed by Zayda Cobos PA-C based on my observations and the provider's statements to me.     2/2/2020   [facility] EMERGENCY DEPARTMENT       Zayda Cobos PA-C  02/02/20 [id number]

## 2020-02-02 NOTE — ED TRIAGE NOTES
Patient was told to have follow up labs after recent visit to ED. Patient given methotrexate at that time. Patient continues to have cramps and vaginal bleeding.

## 2020-02-05 ENCOUNTER — TRANSFERRED RECORDS (OUTPATIENT)
Dept: HEALTH INFORMATION MANAGEMENT | Facility: CLINIC | Age: 30
End: 2020-02-05

## 2020-02-05 LAB
ALT SERPL-CCNC: 22 IU/L (ref 0–32)
AST SERPL-CCNC: 21 IU/L (ref 0–40)
CREAT SERPL-MCNC: 0.68 MG/DL (ref 0.57–1)
GLUCOSE SERPL-MCNC: 109 MG/DL (ref 65–99)
POTASSIUM SERPL-SCNC: 4.1 MMOL/L (ref 3.5–5.2)

## 2020-02-06 ENCOUNTER — INFUSION THERAPY VISIT (OUTPATIENT)
Dept: INFUSION THERAPY | Facility: CLINIC | Age: 30
End: 2020-02-06
Attending: OBSTETRICS & GYNECOLOGY
Payer: COMMERCIAL

## 2020-02-06 ENCOUNTER — TRANSFERRED RECORDS (OUTPATIENT)
Dept: HEALTH INFORMATION MANAGEMENT | Facility: CLINIC | Age: 30
End: 2020-02-06

## 2020-02-06 VITALS
OXYGEN SATURATION: 96 % | HEART RATE: 75 BPM | TEMPERATURE: 97 F | DIASTOLIC BLOOD PRESSURE: 65 MMHG | SYSTOLIC BLOOD PRESSURE: 101 MMHG

## 2020-02-06 DIAGNOSIS — O00.101 RIGHT TUBAL PREGNANCY WITHOUT INTRAUTERINE PREGNANCY: Primary | ICD-10-CM

## 2020-02-06 DIAGNOSIS — O00.90 ECTOPIC PREGNANCY: Primary | ICD-10-CM

## 2020-02-06 PROCEDURE — 96401 CHEMO ANTI-NEOPL SQ/IM: CPT

## 2020-02-06 PROCEDURE — 25000128 H RX IP 250 OP 636: Mod: JW | Performed by: OBSTETRICS & GYNECOLOGY

## 2020-02-06 RX ORDER — METHOTREXATE 25 MG/ML
85 INJECTION, SOLUTION INTRA-ARTERIAL; INTRAMUSCULAR; INTRATHECAL; INTRAVENOUS ONCE
Status: COMPLETED | OUTPATIENT
Start: 2020-02-06 | End: 2020-02-06

## 2020-02-06 RX ORDER — METHOTREXATE 25 MG/ML
85 INJECTION, SOLUTION INTRA-ARTERIAL; INTRAMUSCULAR; INTRATHECAL; INTRAVENOUS ONCE
Status: CANCELLED | OUTPATIENT
Start: 2020-02-06

## 2020-02-06 RX ADMIN — METHOTREXATE 85 MG: 25 INJECTION INTRA-ARTERIAL; INTRAMUSCULAR; INTRATHECAL; INTRAVENOUS at 14:30

## 2020-02-06 NOTE — PROGRESS NOTES
Infusion Nursing Note:  Andressa Whitt presents today for 2nd dose of Methotrexate.    Patient seen by provider today: No   present during visit today: Not Applicable.    Note: Pt received her first dose of Methotrexate in the ED on 1/30/20. HCG level 1640 with continued abd cramping and bleeding.  Orders received from pt's OB to administer 2nd dose of Methotrexate today.  Pt will f/u in ED this Sunday for repeat labs    Intravenous Access:  No Intravenous access/labs at this visit.    Treatment Conditions:  Rh positive on 1/10/20.      Post Infusion Assessment:  Patient tolerated injection without incident  Site patent and intact, free from redness, edema or discomfort.       Discharge Plan:   Discharge instructions reviewed with: Patient.  Patient and/or family verbalized understanding of discharge instructions and all questions answered.  Patient discharged in stable condition accompanied by: self.  Departure Mode: Ambulatory.    Maribel Woods RN

## 2020-02-09 ENCOUNTER — HOSPITAL ENCOUNTER (OUTPATIENT)
Dept: LAB | Facility: CLINIC | Age: 30
Discharge: HOME | End: 2020-02-09
Attending: OBSTETRICS & GYNECOLOGY | Admitting: OBSTETRICS & GYNECOLOGY
Payer: COMMERCIAL

## 2020-02-09 DIAGNOSIS — O00.90 ECTOPIC PREGNANCY: ICD-10-CM

## 2020-02-09 LAB — B-HCG SERPL-ACNC: 962 IU/L (ref 0–5)

## 2020-02-09 PROCEDURE — 36415 COLL VENOUS BLD VENIPUNCTURE: CPT | Performed by: OBSTETRICS & GYNECOLOGY

## 2020-02-09 PROCEDURE — 84702 CHORIONIC GONADOTROPIN TEST: CPT | Performed by: OBSTETRICS & GYNECOLOGY

## 2020-02-14 ENCOUNTER — ANESTHESIA (OUTPATIENT)
Dept: SURGERY | Facility: CLINIC | Age: 30
End: 2020-02-14
Payer: COMMERCIAL

## 2020-02-14 ENCOUNTER — APPOINTMENT (OUTPATIENT)
Dept: ULTRASOUND IMAGING | Facility: CLINIC | Age: 30
End: 2020-02-14
Attending: OBSTETRICS & GYNECOLOGY
Payer: COMMERCIAL

## 2020-02-14 ENCOUNTER — HOSPITAL ENCOUNTER (OUTPATIENT)
Facility: CLINIC | Age: 30
Discharge: HOME | End: 2020-02-14
Attending: EMERGENCY MEDICINE | Admitting: OBSTETRICS & GYNECOLOGY
Payer: COMMERCIAL

## 2020-02-14 ENCOUNTER — ANESTHESIA EVENT (OUTPATIENT)
Dept: SURGERY | Facility: CLINIC | Age: 30
End: 2020-02-14
Payer: COMMERCIAL

## 2020-02-14 VITALS
BODY MASS INDEX: 28.13 KG/M2 | DIASTOLIC BLOOD PRESSURE: 62 MMHG | RESPIRATION RATE: 16 BRPM | TEMPERATURE: 99.3 F | HEART RATE: 61 BPM | OXYGEN SATURATION: 98 % | HEIGHT: 61 IN | SYSTOLIC BLOOD PRESSURE: 112 MMHG | WEIGHT: 149 LBS

## 2020-02-14 DIAGNOSIS — Z98.890 S/P LAPAROSCOPIC PROCEDURE: Primary | ICD-10-CM

## 2020-02-14 DIAGNOSIS — O00.90 ECTOPIC PREGNANCY WITHOUT INTRAUTERINE PREGNANCY, UNSPECIFIED LOCATION: ICD-10-CM

## 2020-02-14 DIAGNOSIS — O00.90 ECTOPIC PREGNANCY: ICD-10-CM

## 2020-02-14 DIAGNOSIS — Z90.79 HX OF UNILATERAL SALPINGECTOMY: ICD-10-CM

## 2020-02-14 LAB
ABO + RH BLD: NORMAL
ABO + RH BLD: NORMAL
ANION GAP SERPL CALCULATED.3IONS-SCNC: 6 MMOL/L (ref 3–14)
B-HCG SERPL-ACNC: 510 IU/L (ref 0–5)
BASOPHILS # BLD AUTO: 0.1 10E9/L (ref 0–0.2)
BASOPHILS NFR BLD AUTO: 0.6 %
BLD GP AB SCN SERPL QL: NORMAL
BLOOD BANK CMNT PATIENT-IMP: NORMAL
BUN SERPL-MCNC: 13 MG/DL (ref 7–30)
CALCIUM SERPL-MCNC: 9.2 MG/DL (ref 8.5–10.1)
CHLORIDE SERPL-SCNC: 105 MMOL/L (ref 94–109)
CO2 SERPL-SCNC: 28 MMOL/L (ref 20–32)
CREAT SERPL-MCNC: 0.66 MG/DL (ref 0.52–1.04)
DIFFERENTIAL METHOD BLD: NORMAL
EOSINOPHIL # BLD AUTO: 0.3 10E9/L (ref 0–0.7)
EOSINOPHIL NFR BLD AUTO: 3.5 %
ERYTHROCYTE [DISTWIDTH] IN BLOOD BY AUTOMATED COUNT: 13.2 % (ref 10–15)
GFR SERPL CREATININE-BSD FRML MDRD: >90 ML/MIN/{1.73_M2}
GLUCOSE SERPL-MCNC: 123 MG/DL (ref 70–99)
HCT VFR BLD AUTO: 38.5 % (ref 35–47)
HGB BLD-MCNC: 11.4 G/DL (ref 11.7–15.7)
HGB BLD-MCNC: 12.7 G/DL (ref 11.7–15.7)
IMM GRANULOCYTES # BLD: 0 10E9/L (ref 0–0.4)
IMM GRANULOCYTES NFR BLD: 0.2 %
LYMPHOCYTES # BLD AUTO: 4.5 10E9/L (ref 0.8–5.3)
LYMPHOCYTES NFR BLD AUTO: 49.7 %
MCH RBC QN AUTO: 30.3 PG (ref 26.5–33)
MCHC RBC AUTO-ENTMCNC: 33 G/DL (ref 31.5–36.5)
MCV RBC AUTO: 92 FL (ref 78–100)
MONOCYTES # BLD AUTO: 0.5 10E9/L (ref 0–1.3)
MONOCYTES NFR BLD AUTO: 6 %
NEUTROPHILS # BLD AUTO: 3.6 10E9/L (ref 1.6–8.3)
NEUTROPHILS NFR BLD AUTO: 40 %
NRBC # BLD AUTO: 0 10*3/UL
NRBC BLD AUTO-RTO: 0 /100
PLATELET # BLD AUTO: 367 10E9/L (ref 150–450)
PLATELET # BLD EST: NORMAL 10*3/UL
POTASSIUM SERPL-SCNC: 3.8 MMOL/L (ref 3.4–5.3)
RBC # BLD AUTO: 4.19 10E12/L (ref 3.8–5.2)
RBC MORPH BLD: NORMAL
SODIUM SERPL-SCNC: 139 MMOL/L (ref 133–144)
SPECIMEN EXP DATE BLD: NORMAL
WBC # BLD AUTO: 9 10E9/L (ref 4–11)

## 2020-02-14 PROCEDURE — 96376 TX/PRO/DX INJ SAME DRUG ADON: CPT

## 2020-02-14 PROCEDURE — 27211024 ZZHC OR SUPPLY OTHER OPNP: Performed by: OBSTETRICS & GYNECOLOGY

## 2020-02-14 PROCEDURE — 25000125 ZZHC RX 250: Performed by: NURSE ANESTHETIST, CERTIFIED REGISTERED

## 2020-02-14 PROCEDURE — 71000027 ZZH RECOVERY PHASE 2 EACH 15 MINS: Performed by: OBSTETRICS & GYNECOLOGY

## 2020-02-14 PROCEDURE — 36000058 ZZH SURGERY LEVEL 3 EA 15 ADDTL MIN: Performed by: OBSTETRICS & GYNECOLOGY

## 2020-02-14 PROCEDURE — 25800025 ZZH RX 258: Performed by: OBSTETRICS & GYNECOLOGY

## 2020-02-14 PROCEDURE — 96361 HYDRATE IV INFUSION ADD-ON: CPT

## 2020-02-14 PROCEDURE — 25000128 H RX IP 250 OP 636: Performed by: ANESTHESIOLOGY

## 2020-02-14 PROCEDURE — 25800030 ZZH RX IP 258 OP 636: Performed by: ANESTHESIOLOGY

## 2020-02-14 PROCEDURE — 96375 TX/PRO/DX INJ NEW DRUG ADDON: CPT

## 2020-02-14 PROCEDURE — 37000008 ZZH ANESTHESIA TECHNICAL FEE, 1ST 30 MIN: Performed by: OBSTETRICS & GYNECOLOGY

## 2020-02-14 PROCEDURE — 88305 TISSUE EXAM BY PATHOLOGIST: CPT | Performed by: OBSTETRICS & GYNECOLOGY

## 2020-02-14 PROCEDURE — 25000128 H RX IP 250 OP 636: Performed by: OBSTETRICS & GYNECOLOGY

## 2020-02-14 PROCEDURE — 37000009 ZZH ANESTHESIA TECHNICAL FEE, EACH ADDTL 15 MIN: Performed by: OBSTETRICS & GYNECOLOGY

## 2020-02-14 PROCEDURE — 25800030 ZZH RX IP 258 OP 636: Performed by: NURSE ANESTHETIST, CERTIFIED REGISTERED

## 2020-02-14 PROCEDURE — 84702 CHORIONIC GONADOTROPIN TEST: CPT | Performed by: EMERGENCY MEDICINE

## 2020-02-14 PROCEDURE — 25000128 H RX IP 250 OP 636: Performed by: NURSE ANESTHETIST, CERTIFIED REGISTERED

## 2020-02-14 PROCEDURE — 86900 BLOOD TYPING SEROLOGIC ABO: CPT | Performed by: EMERGENCY MEDICINE

## 2020-02-14 PROCEDURE — 86901 BLOOD TYPING SEROLOGIC RH(D): CPT | Performed by: EMERGENCY MEDICINE

## 2020-02-14 PROCEDURE — 71000013 ZZH RECOVERY PHASE 1 LEVEL 1 EA ADDTL HR: Performed by: OBSTETRICS & GYNECOLOGY

## 2020-02-14 PROCEDURE — 36000056 ZZH SURGERY LEVEL 3 1ST 30 MIN: Performed by: OBSTETRICS & GYNECOLOGY

## 2020-02-14 PROCEDURE — 76830 TRANSVAGINAL US NON-OB: CPT

## 2020-02-14 PROCEDURE — 88313 SPECIAL STAINS GROUP 2: CPT | Mod: 26 | Performed by: OBSTETRICS & GYNECOLOGY

## 2020-02-14 PROCEDURE — 85025 COMPLETE CBC W/AUTO DIFF WBC: CPT | Performed by: EMERGENCY MEDICINE

## 2020-02-14 PROCEDURE — 99285 EMERGENCY DEPT VISIT HI MDM: CPT | Mod: 25

## 2020-02-14 PROCEDURE — 71000012 ZZH RECOVERY PHASE 1 LEVEL 1 FIRST HR: Performed by: OBSTETRICS & GYNECOLOGY

## 2020-02-14 PROCEDURE — 88305 TISSUE EXAM BY PATHOLOGIST: CPT | Mod: 26 | Performed by: OBSTETRICS & GYNECOLOGY

## 2020-02-14 PROCEDURE — 85018 HEMOGLOBIN: CPT | Performed by: OBSTETRICS & GYNECOLOGY

## 2020-02-14 PROCEDURE — 96374 THER/PROPH/DIAG INJ IV PUSH: CPT

## 2020-02-14 PROCEDURE — 40000306 ZZH STATISTIC PRE PROC ASSESS II: Performed by: OBSTETRICS & GYNECOLOGY

## 2020-02-14 PROCEDURE — 86850 RBC ANTIBODY SCREEN: CPT | Performed by: EMERGENCY MEDICINE

## 2020-02-14 PROCEDURE — 27210794 ZZH OR GENERAL SUPPLY STERILE: Performed by: OBSTETRICS & GYNECOLOGY

## 2020-02-14 PROCEDURE — 80048 BASIC METABOLIC PNL TOTAL CA: CPT | Performed by: EMERGENCY MEDICINE

## 2020-02-14 PROCEDURE — 25000132 ZZH RX MED GY IP 250 OP 250 PS 637: Performed by: OBSTETRICS & GYNECOLOGY

## 2020-02-14 PROCEDURE — 25000128 H RX IP 250 OP 636: Performed by: EMERGENCY MEDICINE

## 2020-02-14 PROCEDURE — 25800030 ZZH RX IP 258 OP 636: Performed by: EMERGENCY MEDICINE

## 2020-02-14 PROCEDURE — 88313 SPECIAL STAINS GROUP 2: CPT | Performed by: OBSTETRICS & GYNECOLOGY

## 2020-02-14 RX ORDER — PROPOFOL 10 MG/ML
INJECTION, EMULSION INTRAVENOUS CONTINUOUS PRN
Status: DISCONTINUED | OUTPATIENT
Start: 2020-02-14 | End: 2020-02-14

## 2020-02-14 RX ORDER — OXYCODONE HYDROCHLORIDE 5 MG/1
5 TABLET ORAL EVERY 4 HOURS PRN
Qty: 15 TABLET | Refills: 0 | Status: SHIPPED | OUTPATIENT
Start: 2020-02-14

## 2020-02-14 RX ORDER — ONDANSETRON 4 MG/1
4 TABLET, ORALLY DISINTEGRATING ORAL EVERY 30 MIN PRN
Status: DISCONTINUED | OUTPATIENT
Start: 2020-02-14 | End: 2020-02-14 | Stop reason: HOSPADM

## 2020-02-14 RX ORDER — DEXAMETHASONE SODIUM PHOSPHATE 4 MG/ML
INJECTION, SOLUTION INTRA-ARTICULAR; INTRALESIONAL; INTRAMUSCULAR; INTRAVENOUS; SOFT TISSUE PRN
Status: DISCONTINUED | OUTPATIENT
Start: 2020-02-14 | End: 2020-02-14

## 2020-02-14 RX ORDER — FENTANYL CITRATE 50 UG/ML
25-50 INJECTION, SOLUTION INTRAMUSCULAR; INTRAVENOUS
Status: DISCONTINUED | OUTPATIENT
Start: 2020-02-14 | End: 2020-02-14 | Stop reason: HOSPADM

## 2020-02-14 RX ORDER — HYDROMORPHONE HYDROCHLORIDE 1 MG/ML
0.5 INJECTION, SOLUTION INTRAMUSCULAR; INTRAVENOUS; SUBCUTANEOUS
Status: DISCONTINUED | OUTPATIENT
Start: 2020-02-14 | End: 2020-02-14 | Stop reason: HOSPADM

## 2020-02-14 RX ORDER — SODIUM CHLORIDE, SODIUM LACTATE, POTASSIUM CHLORIDE, CALCIUM CHLORIDE 600; 310; 30; 20 MG/100ML; MG/100ML; MG/100ML; MG/100ML
INJECTION, SOLUTION INTRAVENOUS CONTINUOUS
Status: DISCONTINUED | OUTPATIENT
Start: 2020-02-14 | End: 2020-02-14 | Stop reason: HOSPADM

## 2020-02-14 RX ORDER — NALOXONE HYDROCHLORIDE 0.4 MG/ML
.1-.4 INJECTION, SOLUTION INTRAMUSCULAR; INTRAVENOUS; SUBCUTANEOUS
Status: DISCONTINUED | OUTPATIENT
Start: 2020-02-14 | End: 2020-02-14 | Stop reason: HOSPADM

## 2020-02-14 RX ORDER — ALBUTEROL SULFATE 0.83 MG/ML
2.5 SOLUTION RESPIRATORY (INHALATION) EVERY 4 HOURS PRN
Status: DISCONTINUED | OUTPATIENT
Start: 2020-02-14 | End: 2020-02-14 | Stop reason: HOSPADM

## 2020-02-14 RX ORDER — ONDANSETRON 2 MG/ML
4 INJECTION INTRAMUSCULAR; INTRAVENOUS EVERY 30 MIN PRN
Status: DISCONTINUED | OUTPATIENT
Start: 2020-02-14 | End: 2020-02-14 | Stop reason: HOSPADM

## 2020-02-14 RX ORDER — SODIUM CHLORIDE 9 MG/ML
1000 INJECTION, SOLUTION INTRAVENOUS CONTINUOUS
Status: DISCONTINUED | OUTPATIENT
Start: 2020-02-14 | End: 2020-02-14 | Stop reason: HOSPADM

## 2020-02-14 RX ORDER — LIDOCAINE 40 MG/G
CREAM TOPICAL
Status: DISCONTINUED | OUTPATIENT
Start: 2020-02-14 | End: 2020-02-14 | Stop reason: HOSPADM

## 2020-02-14 RX ORDER — DIMENHYDRINATE 50 MG/ML
25 INJECTION, SOLUTION INTRAMUSCULAR; INTRAVENOUS
Status: DISCONTINUED | OUTPATIENT
Start: 2020-02-14 | End: 2020-02-14 | Stop reason: HOSPADM

## 2020-02-14 RX ORDER — ACETAMINOPHEN 325 MG/1
650 TABLET ORAL
Status: DISCONTINUED | OUTPATIENT
Start: 2020-02-14 | End: 2020-02-14 | Stop reason: HOSPADM

## 2020-02-14 RX ORDER — LIDOCAINE HYDROCHLORIDE 10 MG/ML
INJECTION, SOLUTION INFILTRATION; PERINEURAL PRN
Status: DISCONTINUED | OUTPATIENT
Start: 2020-02-14 | End: 2020-02-14

## 2020-02-14 RX ORDER — FENTANYL CITRATE 50 UG/ML
INJECTION, SOLUTION INTRAMUSCULAR; INTRAVENOUS PRN
Status: DISCONTINUED | OUTPATIENT
Start: 2020-02-14 | End: 2020-02-14

## 2020-02-14 RX ORDER — HYDROCODONE BITARTRATE AND ACETAMINOPHEN 5; 325 MG/1; MG/1
1 TABLET ORAL
Status: COMPLETED | OUTPATIENT
Start: 2020-02-14 | End: 2020-02-14

## 2020-02-14 RX ORDER — MEPERIDINE HYDROCHLORIDE 50 MG/ML
12.5 INJECTION INTRAMUSCULAR; INTRAVENOUS; SUBCUTANEOUS
Status: DISCONTINUED | OUTPATIENT
Start: 2020-02-14 | End: 2020-02-14 | Stop reason: HOSPADM

## 2020-02-14 RX ORDER — ONDANSETRON 4 MG/1
4 TABLET, ORALLY DISINTEGRATING ORAL EVERY 8 HOURS PRN
COMMUNITY

## 2020-02-14 RX ORDER — ONDANSETRON 2 MG/ML
INJECTION INTRAMUSCULAR; INTRAVENOUS PRN
Status: DISCONTINUED | OUTPATIENT
Start: 2020-02-14 | End: 2020-02-14

## 2020-02-14 RX ORDER — KETOROLAC TROMETHAMINE 30 MG/ML
30 INJECTION, SOLUTION INTRAMUSCULAR; INTRAVENOUS ONCE
Status: COMPLETED | OUTPATIENT
Start: 2020-02-14 | End: 2020-02-14

## 2020-02-14 RX ORDER — ACETAMINOPHEN 500 MG
500-1000 TABLET ORAL EVERY 6 HOURS PRN
COMMUNITY

## 2020-02-14 RX ORDER — GLYCOPYRROLATE 0.2 MG/ML
INJECTION, SOLUTION INTRAMUSCULAR; INTRAVENOUS PRN
Status: DISCONTINUED | OUTPATIENT
Start: 2020-02-14 | End: 2020-02-14

## 2020-02-14 RX ORDER — NEOSTIGMINE METHYLSULFATE 1 MG/ML
VIAL (ML) INJECTION PRN
Status: DISCONTINUED | OUTPATIENT
Start: 2020-02-14 | End: 2020-02-14

## 2020-02-14 RX ORDER — HYDROMORPHONE HYDROCHLORIDE 1 MG/ML
.3-.5 INJECTION, SOLUTION INTRAMUSCULAR; INTRAVENOUS; SUBCUTANEOUS EVERY 10 MIN PRN
Status: DISCONTINUED | OUTPATIENT
Start: 2020-02-14 | End: 2020-02-14 | Stop reason: HOSPADM

## 2020-02-14 RX ORDER — PROPOFOL 10 MG/ML
INJECTION, EMULSION INTRAVENOUS PRN
Status: DISCONTINUED | OUTPATIENT
Start: 2020-02-14 | End: 2020-02-14

## 2020-02-14 RX ADMIN — DEXAMETHASONE SODIUM PHOSPHATE 8 MG: 4 INJECTION, SOLUTION INTRA-ARTICULAR; INTRALESIONAL; INTRAMUSCULAR; INTRAVENOUS; SOFT TISSUE at 11:56

## 2020-02-14 RX ADMIN — ONDANSETRON HYDROCHLORIDE 4 MG: 2 INJECTION, SOLUTION INTRAVENOUS at 13:01

## 2020-02-14 RX ADMIN — Medication 4 MG: at 13:23

## 2020-02-14 RX ADMIN — HYDROMORPHONE HYDROCHLORIDE 0.5 MG: 1 INJECTION, SOLUTION INTRAMUSCULAR; INTRAVENOUS; SUBCUTANEOUS at 09:46

## 2020-02-14 RX ADMIN — FENTANYL CITRATE 150 MCG: 50 INJECTION, SOLUTION INTRAMUSCULAR; INTRAVENOUS at 11:56

## 2020-02-14 RX ADMIN — FENTANYL CITRATE 50 MCG: 50 INJECTION, SOLUTION INTRAMUSCULAR; INTRAVENOUS at 16:13

## 2020-02-14 RX ADMIN — MIDAZOLAM 2 MG: 1 INJECTION INTRAMUSCULAR; INTRAVENOUS at 11:52

## 2020-02-14 RX ADMIN — PROPOFOL 75 MCG/KG/MIN: 10 INJECTION, EMULSION INTRAVENOUS at 12:04

## 2020-02-14 RX ADMIN — FENTANYL CITRATE 50 MCG: 50 INJECTION, SOLUTION INTRAMUSCULAR; INTRAVENOUS at 14:12

## 2020-02-14 RX ADMIN — SODIUM CHLORIDE 1000 ML: 9 INJECTION, SOLUTION INTRAVENOUS at 06:42

## 2020-02-14 RX ADMIN — DEXMEDETOMIDINE HYDROCHLORIDE 0.4 MCG/KG/HR: 100 INJECTION, SOLUTION INTRAVENOUS at 13:11

## 2020-02-14 RX ADMIN — HYDROMORPHONE HYDROCHLORIDE 0.5 MG: 1 INJECTION, SOLUTION INTRAMUSCULAR; INTRAVENOUS; SUBCUTANEOUS at 06:44

## 2020-02-14 RX ADMIN — ROCURONIUM BROMIDE 10 MG: 10 INJECTION INTRAVENOUS at 12:56

## 2020-02-14 RX ADMIN — FENTANYL CITRATE 50 MCG: 50 INJECTION, SOLUTION INTRAMUSCULAR; INTRAVENOUS at 14:17

## 2020-02-14 RX ADMIN — HYDROCODONE BITARTRATE AND ACETAMINOPHEN 1 TABLET: 5; 325 TABLET ORAL at 15:14

## 2020-02-14 RX ADMIN — SODIUM CHLORIDE, POTASSIUM CHLORIDE, SODIUM LACTATE AND CALCIUM CHLORIDE: 600; 310; 30; 20 INJECTION, SOLUTION INTRAVENOUS at 12:56

## 2020-02-14 RX ADMIN — FENTANYL CITRATE 50 MCG: 50 INJECTION, SOLUTION INTRAMUSCULAR; INTRAVENOUS at 12:41

## 2020-02-14 RX ADMIN — FENTANYL CITRATE 50 MCG: 50 INJECTION, SOLUTION INTRAMUSCULAR; INTRAVENOUS at 14:27

## 2020-02-14 RX ADMIN — SODIUM CHLORIDE 1000 ML: 9 INJECTION, SOLUTION INTRAVENOUS at 09:46

## 2020-02-14 RX ADMIN — PROPOFOL: 10 INJECTION, EMULSION INTRAVENOUS at 12:35

## 2020-02-14 RX ADMIN — HYDROMORPHONE HYDROCHLORIDE 0.5 MG: 1 INJECTION, SOLUTION INTRAMUSCULAR; INTRAVENOUS; SUBCUTANEOUS at 14:32

## 2020-02-14 RX ADMIN — LIDOCAINE HYDROCHLORIDE 30 MG: 10 INJECTION, SOLUTION INFILTRATION; PERINEURAL at 11:56

## 2020-02-14 RX ADMIN — SODIUM CHLORIDE, POTASSIUM CHLORIDE, SODIUM LACTATE AND CALCIUM CHLORIDE: 600; 310; 30; 20 INJECTION, SOLUTION INTRAVENOUS at 11:51

## 2020-02-14 RX ADMIN — ROCURONIUM BROMIDE 40 MG: 10 INJECTION INTRAVENOUS at 11:56

## 2020-02-14 RX ADMIN — GLYCOPYRROLATE 0.6 MG: 0.2 INJECTION, SOLUTION INTRAMUSCULAR; INTRAVENOUS at 13:23

## 2020-02-14 RX ADMIN — KETOROLAC TROMETHAMINE 30 MG: 30 INJECTION, SOLUTION INTRAMUSCULAR at 14:15

## 2020-02-14 RX ADMIN — FENTANYL CITRATE 50 MCG: 50 INJECTION, SOLUTION INTRAMUSCULAR; INTRAVENOUS at 14:53

## 2020-02-14 RX ADMIN — PROPOFOL 200 MG: 10 INJECTION, EMULSION INTRAVENOUS at 11:56

## 2020-02-14 RX ADMIN — GLYCOPYRROLATE 0.2 MG: 0.2 INJECTION, SOLUTION INTRAMUSCULAR; INTRAVENOUS at 11:56

## 2020-02-14 RX ADMIN — ONDANSETRON 4 MG: 2 INJECTION INTRAMUSCULAR; INTRAVENOUS at 06:42

## 2020-02-14 ASSESSMENT — ENCOUNTER SYMPTOMS
ABDOMINAL PAIN: 1
VOMITING: 1
NAUSEA: 1

## 2020-02-14 ASSESSMENT — MIFFLIN-ST. JEOR: SCORE: 1338.24

## 2020-02-14 NOTE — ED PROVIDER NOTES
History     Chief Complaint:  Abdominal Pain    HPI   Andressa Whitt is a 29 year old female,  at about 7 weeks pregnant based on LMP of 2019, with a known ectopic pregnancy, who presents for evaluation of abdominal pain. The results of the ultrasounds and HCG tests from 1/10- can be seen below. The patient reports waking up with severe right sided abdominal pain about 30 minutes prior to arrival. She attempted to take Tylenol for the pain, but had an episode of emesis immediately after taking the medication. She also endorses brown vaginal bleeding last night and bright red vaginal bleeding this morning. She has had 2 methotrexate shots, on  in the emergency department and again on . The patient reports following with Mamadou Ratliff of OB/GYN in the [facility] system.     EXAM: ULTRASOUND OBSTETRIC FIRST TRIMESTER WITH ENDOVAGINAL IMAGING -1/10/2020 2:03 AM  IMPRESSION:  1. No products of conception are visualized in the endometrial cavity. In the setting of a positive pregnancy test, the differential diagnosis includes early intrauterine pregnancy, spontaneous , and ectopic pregnancy. Recommend correlation with   beta-hCG levels and follow-up ultrasound as clinically indicated.  2. A small amount of nonspecific free fluid in the pelvis.  3. Several small follicles are present in the periphery of the ovaries. This could be physiologic, but can also be seen polycystic ovarian syndrome.    ULTRASOUND OB LESS THAN 14 WEEKS SINGLE - 2020 3:52 PM  IMPRESSION:   1. No intrauterine pregnancy is identified.   2. A 3.9 cm right adnexal mass containing a central cystic region is  considered suspicious for an ectopic pregnancy.  3. Small amount of complex free fluid in the pelvis is suspicious for  the presence of blood products.     [Critical Result: Findings suspicious for a right ectopic pregnancy]     Finding was identified on 2020 3:58 PM.   Dr. Cintron was contacted by me on  2020 4:03 PM and verbalized  understanding of the critical result.      TIM ROSE MD    Transvaginal ultrasound examination - 2020  Gestational sac: not visualized  Yolk sac: not visualized  Embryo: not visualized  Cardiac activity: absent  The right adnexa contains a smooth walled simple cyst measuring 28.0 x 30.6 mm. There is a solid complex area in the right adnexa measuring 24.9 x 13.6 mm. The uterus appears  Empty. Bilateral ovaries appear normal.     MAIA SALGADO MD    HCG Results  1/10/2020 - HCG Quantitative Serum: 383  2020 - HCG Quantitative Serum: 1857  2020 - HCG Quantitative Serum: 1755  2020 - HCG Quantitative Serum: 962    Allergies:  No Known Drug Allergies     Medications:    Colace  Pre-mo vitamins    Past Medical History:    Ectopic pregnancy   Asthma    Past Surgical History:    Appendectomy   ACL reconstruction surgery     Family History:    No past pertinent family history.     Social History:  The patient was accompanied to the ED by her .  Smoking Status: Never Smoker  Smokeless Tobacco: Never Used  Alcohol Use: Not Currently  Drug Use: Never  Marital Status:   [2]     Review of Systems   Gastrointestinal: Positive for abdominal pain, nausea and vomiting.   Genitourinary: Positive for vaginal bleeding.   All other systems reviewed and are negative.    Physical Exam     Patient Vitals for the past 24 hrs:   BP Temp Temp src Pulse Resp SpO2 Height Weight   20 0900 -- -- -- -- -- 98 % -- --   20 0845 121/62 -- -- 65 -- 97 % -- --   20 0830 114/83 -- -- 84 -- 100 % -- --   20 0815 123/72 -- -- 76 -- 99 % -- --   20 0800 114/64 -- -- 63 -- 100 % -- --   20 0745 125/75 -- -- 71 -- 100 % -- --   20 0730 126/88 -- -- 74 -- 99 % -- --   20 0700 132/71 -- -- 68 -- 98 % -- --   20 0645 (!) 137/98 -- -- 72 -- 100 % -- --   20 (!) 129/105 -- -- 87 -- 98 % -- --   20 (!)  "142/100 98.7  F (37.1  C) Temporal 93 22 100 % 1.549 m (5' 1\") 67.6 kg (149 lb)        Physical Exam  General: obviously uncomfortable, wincing in pain.  HENT:  Normal nose, oropharynx.   Eyes: EOMI. Normal conjunctiva.  Neck:  Normal range of motion and appearance.   Cardiovascular:  Normal rate, regular rhythm.   Pulmonary/Chest:  Effort normal.  Abdominal: low abdominal tenderness.   Musculoskeletal: Normal range of motion.  Neurological: oriented, normal strength, sensation, and coordination.   Skin: Warm and dry. No rash or bruising.   Psychiatric: Normal mood and affect. Normal behavior and judgement.      Emergency Department Course     Imaging:   US Pelvic Complete w transvaginal   IMPRESSION:  4.4 cm solid and cystic right adnexal mass, consistent  with the known ectopic. Complex heterogenous area in the right adnexa,  indeterminate, could represent blood products within a dilated  fallopian tube or complex collection. Reading per radiology.     Laboratory:  CBC: WBC: 9.0, HGB 12.7, PLT: 367  BMP: Glucose 123 (H), o/w WNL (Creatinine: 0.66)    HCG Quantitative: 510 (H)    ABO/Rh type and screen: ABO A, RH (D) Pos, Antibody Screen Negative     0919 Hemoglobin: 11.4 (L)    Interventions:  0642 NS 1000 mL IV Bolus  0624 Zofran 4 mg IV  0644 Dilaudid 0.5 mg IV  0946 Dilaudid 0.5 mg IV  0946 NS 1000 mL IV    Emergency Department Course:   Past medical records, nursing notes, and vitals reviewed.  0624: I performed an exam of the patient and obtained history, as documented above.    IV fluids and medication administered.     0644 I spoke with Dr. Vickers, OB/GYN, regarding the patient. She stated that either her or her colleague will come to the emergency department to evaluate the patient.     0715 I rechecked and updated the patient. The patient notes her pain is now a 7/10. She has normal blood pressure and heart rate. The OB has not yet arrived to the bedside.     0900 I spoke with Dr. Vickers, OB/GYN, after she " evaluated the patient.     The patient was sent for a US Pelvic Complete w transvaginal while in the emergency department, results above.      1033 The patient will be sent to the OR.     I personally reviewed the laboratory and imaging results with the Patient and answered all related questions.     Impression & Plan      Medical Decision Making:  Hemodynamically stable 29-year-old with a history of known right-sided ectopic pregnancy has presented with acute intense low abdominal pain.  She has been treated with methotrexate x2 as detailed above.  She was made n.p.o., a peripheral IV established, and OB/GYN consultation obtained.  Initial laboratory tests included a hemoglobin of 12.7 and quantitative hCG of 510.  Most recent quantitative hCG on February 9 was 962.  OB has ordered a pelvic ultrasound and repeat hemoglobin.  The repeat is 11.4 and the pelvic ultrasound identifies a 4.4 cm solid and cystic right adnexal mass consistent with the known ectopic.  There is complex heterogenous material in the adnexa which under the circumstances likely represents blood products.  She is remained hemodynamically stable and will be going to the operating room with OB/GYN.    Diagnosis:    ICD-10-CM    1. Ectopic pregnancy without intrauterine pregnancy, unspecified location O00.90 CBC with platelets differential     Basic metabolic panel     HCG quantitative pregnancy     ABO/Rh type and screen       Disposition:  Sent to the OR    Scribe Disclosure:   I, Marion Mcclure, am serving as a scribe on 2/14/2020 at 6:34 AM to personally document services performed by Hua Garrison MD based on my observations and the provider's statements to me.      Marion Mcclure  2/14/2020   [facility] EMERGENCY DEPARTMENT       Hua Garrison MD  02/14/20 1126

## 2020-02-14 NOTE — PROGRESS NOTES
2020        30yo  AAF who is s/p methotrexate 2 dose regimen for suspected ectopic, here for worsening abdominal pain.  Of note, she was seen in the ER  for spotting and worsening pain.  She was noted to have abnormally rising HCG over a 3 week period, and on  HCG was ~1850.  US at that time showed a complex R adnexal mass 3.9 cm suspicious for ectopic.  She received MTX x 1 dose with outpt follow-up.  As an outpt, her HCG between days 4 to 7 did not drop more than 15% (~1755 to 1600s) although pelvic US at the time showed that complex cyst smaller in size and also a simple appearing cyst 2.8 x 3 cm.  She was given a 2nd dose of MTX. Her bHCG subsequently dropped from 962 on  to 510 today.  She woke up suddenly this AM with significant RLQ pain, constant with intermittent sharp.  She has had spotting brown since prior admission, however today it was more red.  In the ER her Hgb was 12.7 and she currently appears hemodynamically stable however dose have moderate ttp x 4 quadrants with guarding.    Plan to order repeat Hgb, and pelvic US.  Given significant drop in bHCG one would assume that the 2 dose MTX regimen had worked however this is not consistent with her clinical examination. Consider possible tubal .  Pt to remain NPO.  If US shows significant complex fluid in pelvis, or dropping Hgb, then d/w pt may need laparoscopy, possible right salpingectomy.  Pt understands and agrees all questions answered.      Full dictation/consultation to follow.      LEVAR DURAN MD

## 2020-02-14 NOTE — BRIEF OP NOTE
[facility]    Brief Operative Note    Pre-operative diagnosis: Ectopic pregnancy [O00.90]  Post-operative diagnosis 1. Ruptured right tubal ectopic pregnancy, 2. Paratubal cyst    Procedure: Procedure(s):  SINGLE SITE LAPAROSCOPY, RIGHT SALPINGECTOMY, LYSIS OF ADHESIONS, REMOVAL OF PARATUBAL CYST  Surgeon: Surgeon(s) and Role:     * Mamadou Ratliff MD - Primary     * Dorota Marti PA-C  Anesthesia: General   Estimated blood loss: <30 cc  Drains: None  Specimens:   ID Type Source Tests Collected by Time Destination   A : RIGHT FALLOPIAN TUBE, ECTOPIC PREGNANCY, PARATUBAL CYST Tissue Ectopic/ Fallopian Tube  SURGICAL PATHOLOGY EXAM Mamadou Ratliff MD 2/14/2020  1:23 PM      Findings:   Significantly enlarged/dilated R fallopian tube with what appears to be a distal rupture surrounded by organized clot, and moderate adhesions to R adnexa/posterior uterus/pelvic side wall.  Normal appearing BL ovaries, left tube.  Right ovary adhered to pelvic side wall.  ~ 2.5 cm paratubal cyst  Complications: None.  Implants: * No implants in log *

## 2020-02-14 NOTE — DISCHARGE INSTRUCTIONS
GENERAL ANESTHESIA OR SEDATION ADULT DISCHARGE INSTRUCTIONS   SPECIAL PRECAUTIONS FOR 24 HOURS AFTER SURGERY    IT IS NOT UNUSUAL TO FEEL LIGHT-HEADED OR FAINT, UP TO 24 HOURS AFTER SURGERY OR WHILE TAKING PAIN MEDICATION.  IF YOU HAVE THESE SYMPTOMS; SIT FOR A FEW MINUTES BEFORE STANDING AND HAVE SOMEONE ASSIST YOU WHEN YOU GET UP TO WALK OR USE THE BATHROOM.    YOU SHOULD REST AND RELAX FOR THE NEXT 24 HOURS AND YOU MUST MAKE ARRANGEMENTS TO HAVE SOMEONE STAY WITH YOU FOR AT LEAST 24 HOURS AFTER YOUR DISCHARGE.  AVOID HAZARDOUS AND STRENUOUS ACTIVITIES.  DO NOT MAKE IMPORTANT DECISIONS FOR 24 HOURS.    DO NOT DRIVE ANY VEHICLE OR OPERATE MECHANICAL EQUIPMENT FOR 24 HOURS FOLLOWING THE END OF YOUR SURGERY.  EVEN THOUGH YOU MAY FEEL NORMAL, YOUR REACTIONS MAY BE AFFECTED BY THE MEDICATION YOU HAVE RECEIVED.    DO NOT DRINK ALCOHOLIC BEVERAGES FOR 24 HOURS FOLLOWING YOUR SURGERY.    DRINK CLEAR LIQUIDS (APPLE JUICE, GINGER ALE, 7-UP, BROTH, ETC.).  PROGRESS TO YOUR REGULAR DIET AS YOU FEEL ABLE.    YOU MAY HAVE A DRY MOUTH, A SORE THROAT, MUSCLES ACHES OR TROUBLE SLEEPING.  THESE SHOULD GO AWAY AFTER 24 HOURS.    CALL YOUR DOCTOR FOR ANY OF THE FOLLOWING:  SIGNS OF INFECTION (FEVER, GROWING TENDERNESS AT THE SURGERY SITE, A LARGE AMOUNT OF DRAINAGE OR BLEEDING, SEVERE PAIN, FOUL-SMELLING DRAINAGE, REDNESS OR SWELLING.    IT HAS BEEN OVER 8 TO 10 HOURS SINCE SURGERY AND YOU ARE STILL NOT ABLE TO URINATE (PASS WATER).       LAPAROSCOPY DISCHARGE INSTRUCTIONS    PLEASE RETURN TO THE CLINIC IN:    2 WEEKS   MAKE THIS APPOINTMENT AFTER YOU GET HOME IF IT HAS NOT ALREADY BEEN SCHEDULED.    DO NOT DRIVE A CAR, DRINK ALCOHOL OR USE MACHINERY FOR THE NEXT 24 HOURS.  YOU SHOULD WAIT UNTIL YOU HAVE RECOVERED BEFORE MAKING ANY IMPORTANT DECISIONS.    PAIN  YOU MAY HAVE CRAMPS, SHOULDER PAIN OR A LOW BACKACHE FOR 24 TO 48 HOURS.  TYLENOL (ACETAMINOPHEN) OR MOTRIN (IBUPROFEN) MAY HELP, OR YOUR DOCTOR MAY GIVE YOU PAIN MEDICINE.   CALL YOUR DOCTOR IF PAIN CANNOT BE CONTROLLED.    BLEEDING OR VAGINAL DISCHARGE  YOU MAY HAVE SOME BLEEDING OR DISCHARGE FOR UP TO A WEEK OR LONGER.  DO NOT DOUCHE, USE TAMPONS OR HAVE SEX (INTERCOURSE) FOR 4 weeks.  CALL YOUR DOCTOR IF YOU SOAK MORE THAN ONE MAXI PAD (SANITARY [place]) PER HOUR, OR IF YOU PASS LARGE BLOOD CLOTS.    FEVER  YOU MAY HAVE A LOW FEVER FOR THE FIRST TWO DAYS.  CALL YOUR DOCTOR IF IT GOES OVER 101 DEGREES.    NAUSEA  IF YOU HAVE NAUSEA (FEEL SICK TO YOUR STOMACH), STAY IN BED.  TRY DRINKING A SMALL AMOUNT OF 7-UP, TEA OR SOUP.    SWOLLEN BELLY  IF YOUR ABDOMEN (BELLY AREA) FEELS FIRM OR SWOLLEN, CALL YOUR DOCTOR.    DIZZINESS AND WEAKNESS  YOU MAY FEEL DIZZY OR WEAK FOR A FEW DAYS.  IF SO, YOU SHOULD REST OFTEN, STAND UP SLOWLY AND USE CARE WHEN CLIMBING STAIRS.    DIET AND ACTIVITY  EAT LIGHT MEALS AND DRINK PLENTY OF FLUIDS FOR THE FIRST 24 HOURS (OR LONGER, IF YOU HAVE NAUSEA).  WAIT 5 DAYS BEFORE BATHING.  SHOWERS ARE OKAY.  MOST WOMEN CAN RETURN TO WORK AFTER 24 HOURS.  YOU MAY GO BACK TO YOUR OTHER ACTIVITIES AFTER YOUR PAIN GOES AWAY.      IF YOU HAVE STITCHES  YOU MAY REMOVE YOUR BANDAGE THE DAY AFTER TREATMENT.  YOUR DOCTOR WILL TELL YOU IF YOUR STITCHES NEED TO BE REMOVED.  SOME STITCHES DISSOLVE OVER TIME.      DR. LEVAR YEAGER M.D.           CLINIC PHONE NUMBER:  [phone]  [facility]    You received Toradol, an IV form of ibuprofen (Motrin) at 2:15pm.  Do not take any ibuprofen products until 8:15pm.

## 2020-02-14 NOTE — ED TRIAGE NOTES
Pt to ER with c/o severe abd pain pt last had methotrexate shot for ectopic preg thurs, now severe lower abd pain, ectopic was on the right side

## 2020-02-14 NOTE — ED NOTES
Patient woke up to pain and increased bright red blood 30 minutes ago. Has diagnosed ectopic pregnancy and has received 2 doses of Methotrexate.

## 2020-02-14 NOTE — ANESTHESIA CARE TRANSFER NOTE
Patient: Andressa Whitt    Procedure(s):  SINGLE SITE LAPAROSCOPY, RIGHT SALPINGECTOMY, LYSIS OF ADHESIONS, REMOVAL OF PARATUBAL CYST    Diagnosis: Ectopic pregnancy [O00.90]  Diagnosis Additional Information: No value filed.    Anesthesia Type:   General, ETT     Note:    Patient transferred to:PACU  Comments: Pt spont resps to PACU VSS report to RNHandoff Report: Identifed the Patient, Identified the Reponsible Provider, Reviewed the pertinent medical history, Discussed the surgical course, Reviewed Intra-OP anesthesia mangement and issues during anesthesia, Set expectations for post-procedure period and Allowed opportunity for questions and acknowledgement of understanding      Vitals: (Last set prior to Anesthesia Care Transfer)    CRNA VITALS  2/14/2020 1307 - 2/14/2020 1340      2/14/2020             Pulse:  90    SpO2:  100 %    Resp Rate (observed):  13                Electronically Signed By: SHELLY Adams CRNA  February 14, 2020  1:40 PM

## 2020-02-14 NOTE — ED NOTES
[facility]  ED Nurse Handoff Report    Andressa Whitt is a 29 year old female   ED Chief complaint: Abdominal Pain  . ED Diagnosis:   Final diagnoses:   Ectopic pregnancy without intrauterine pregnancy, unspecified location     Allergies: No Known Allergies    Code Status: Full Code  Activity level - Baseline/Home:  Independent. Activity Level - Current:   Independent. Lift room needed: No. Bariatric: No   Needed: No   Isolation: No. Infection: Not Applicable.     Vital Signs:   Vitals:    02/14/20 0845 02/14/20 0900 02/14/20 0915 02/14/20 0945   BP: 121/62 115/74 110/66 121/67   Pulse: 65 69 69 57   Resp:       Temp:       TempSrc:       SpO2: 97% 98% 99% 95%   Weight:       Height:           Cardiac Rhythm:  ,      Pain level: 7  Patient confused: No. Patient Falls Risk: Yes.   Elimination Status: Has voided   Patient Report - Initial Complaint: Abdomen pain/Ectopic pregnancy complications. Focused Assessment: See Physician Note   Tests Performed: Labs. Abnormal Results: .  Labs Ordered and Resulted from Time of ED Arrival Up to the Time of Departure from the ED   BASIC METABOLIC PANEL - Abnormal; Notable for the following components:       Result Value    Glucose 123 (*)     All other components within normal limits   HCG QUANTITATIVE PREGNANCY - Abnormal; Notable for the following components:    HCG Quantitative Serum 510 (*)     All other components within normal limits   HEMOGLOBIN - Abnormal; Notable for the following components:    Hemoglobin 11.4 (*)     All other components within normal limits   CBC WITH PLATELETS DIFFERENTIAL   PERIPHERAL IV CATHETER   ABO/RH TYPE AND SCREEN     .  US Pelvic Complete w Transvaginal   Preliminary Result   IMPRESSION:  4.4 cm solid and cystic right adnexal mass, consistent   with the known ectopic. Complex heterogenous area in the right adnexa,   indeterminate, could represent blood products within a dilated   fallopian tube or complex collection.            Treatments provided: Fluids, Medication  Family Comments: Spouse present agreeable to admit  OBS brochure/video discussed/provided to patient:  N/A  ED Medications:   Medications   0.9% sodium chloride BOLUS (1,000 mLs Intravenous New Bag 2/14/20 0642)     Followed by   sodium chloride 0.9% infusion (has no administration in time range)   ondansetron (ZOFRAN) injection 4 mg (4 mg Intravenous Given 2/14/20 0642)   HYDROmorphone (PF) (DILAUDID) injection 0.5 mg (0.5 mg Intravenous Given 2/14/20 0644)     Drips infusing:  No  For the majority of the shift, the patient's behavior Green. Interventions performed were None.     Severe Sepsis OR Septic Shock Diagnosis Present: No      ED Nurse Name/Phone Number: Laisha Alicea RN,   6:57 AM

## 2020-02-14 NOTE — PHARMACY-ADMISSION MEDICATION HISTORY
Admission medication history interview status for this patient is complete. See [facility] admission navigator for allergy information, prior to admission medications and immunization status.     Medication history interview source(s):Patient  Medication history resources (including written lists, pill bottles, clinic record):EPIC list, phone picture of meds  Primary pharmacy:-----    Changes made to PTA medication list:  Added: tylenol extra strength  Deleted: prn norco (gone),  flagyl rx from 2020  Changed: re-entered zofran     Actions taken by pharmacist (provider contacted, etc):None     Additional medication history information:pt received IM methotrexate earlier this month    Medication reconciliation/reorder completed by provider prior to medication history? No      For patients on insulin therapy:N  Prior to Admission medications    Medication Sig Last Dose Taking? Auth Provider   acetaminophen (TYLENOL) 500 MG tablet Take 500-1,000 mg by mouth every 6 hours as needed for mild pain 2020 at am Yes Unknown, Entered By History   ondansetron (ZOFRAN-ODT) 4 MG ODT tab Take 4 mg by mouth every 8 hours as needed for nausea no usage of rx yet Yes Unknown, Entered By History   docusate sodium (COLACE) 100 MG tablet Take 1 tablet (100 mg) by mouth daily 2020 at Unknown time  Eileen Cintron, DO   polyethylene glycol (MIRALAX) powder Take 17 g (1 capful) by mouth daily More than a month at Unknown time  Eileen Cintron, DO   Prenatal Vit-Fe Fumarate-FA (PRENATAL MULTIVITAMIN W/IRON) 27-0.8 MG tablet Take 1 tablet by mouth daily 2020 at was instructed by MD to stop after first dose methotrexate given  Reported, Patient

## 2020-02-14 NOTE — ANESTHESIA POSTPROCEDURE EVALUATION
Patient: Andressa Whitt    Procedure(s):  SINGLE SITE LAPAROSCOPY, RIGHT SALPINGECTOMY, LYSIS OF ADHESIONS, REMOVAL OF PARATUBAL CYST    Diagnosis:Ectopic pregnancy [O00.90]  Diagnosis Additional Information: No value filed.    Anesthesia Type:  General, ETT    Note:  Anesthesia Post Evaluation    Patient location during evaluation: PACU  Patient participation: Able to fully participate in evaluation  Level of consciousness: awake  Pain management: adequate  Airway patency: patent  Cardiovascular status: acceptable  Respiratory status: acceptable  Hydration status: acceptable  PONV: controlled     Anesthetic complications: None          Last vitals:  Vitals:    02/14/20 1415 02/14/20 1420 02/14/20 1430   BP: 122/71 122/71 119/68   Pulse:  61    Resp: 16 29 11   Temp:      SpO2: 100% 99% 100%         Electronically Signed By: Jcarlos Berg DO  February 14, 2020  2:44 PM

## 2020-02-14 NOTE — ED NOTES
"Back from US transvaginal; reports pain 10/10 while at US.  Pain currently 7/10 with \"sharp\" pain every few minutes.   "

## 2020-02-14 NOTE — H&P
Admitted:     2020      HISTORY OF PRESENT ILLNESS:  This is a 29-year-old -0-1-1 -American female who presented to the ER on the morning of 2020 with sudden onset of severe pelvic and abdominal pain.  Of note, her history is remarkable for recent suspected right ectopic pregnancy for which she had originally had presented to the ER on 2020.  At that time she was having worsening right lower quadrant pelvic pain and vaginal spotting.  Her beta hCG at that time was found to be 1857.  Her previous beta hCG on 01/10/2020 with 383.  Ultrasound on  showed a 3.9 cm complex cystic mass in the right adnexa adjacent to the right ovary, suspicious for ectopic pregnancy, with only a small amount of fluid in the cul-de-sac.  She was hemodynamically stable.  Her hemoglobin was normal and she was deemed a candidate for methotrexate.  She received her first dose of methotrexate on 2020 and followed up closely as an outpatient.  On day 4 after her methotrexate, her beta hCG level was found to be approximately 1755.  On day 7, she followed up in the GYN office with a repeat beta hCG that was in the 1600s, which was less than a 15% drop from her previous hCG level.  Repeat ultrasound was performed at that time which showed a simple-appearing cyst in the right adnexa measuring approximately 3 cm and also a complex cystic area in the right adnexa that did measure smaller than previous ultrasound in the ER, approximately 2.5 x 1.4 cm.  She appeared stable with minimal pain; however, due to the inadequate drop in her beta hCG, she was given a second dose of methotrexate per the 2-dose recommended regimen.  Her subsequent followup revealed a beta hCG of 962 on 2020.  However, once again, she awoke in the morning of  with severe onset of lower pelvic and right lower quadrant pain.  Upon arrival to the ER, her repeat beta hCG was found to be 510.  Hemoglobin was normal at 12.7.  She  appeared hemodynamically stable; however, she had moderate tenderness to palpation in the entire abdomen with guarding.  Repeat pelvic ultrasound was performed which revealed a uterus measuring 6.8 x 5.1 x 3.7 cm with no IUP and endometrium measuring 4 mm in thickness.  The right ovary measured 2.9 x 2.2 cm and adjacent to the right ovary in the right adnexal area was a 4.4 x 3.3 x 2.4 cm solid and cystic mass highly worrisome for ectopic.  Complex heterogeneous echogenic area in the right adnexa.  The left ovary measured 3.8 x 2.1 cm with normal blood flow.  There was a small amount of complex fluid in the pelvis.  Her repeat hemoglobin was 11.4.  Due to severe worsening pelvic pain and increase in size of the right adnexal masses on ultrasound, decision was made to proceed with surgical management.      PAST OB/GYN HISTORY:  The patient has had 1 prior full-term vaginal delivery and 1 prior first trimester miscarriage.      PAST MEDICAL HISTORY:  Denies.      PAST SURGICAL HISTORY:  Denies.      MEDICATIONS:  Please see MAR.      ALLERGIES:  NO KNOWN DRUG ALLERGIES.      SOCIAL HISTORY:  Denies smoking, alcohol, or illicit drug use.      REVIEW OF SYSTEMS:  As per HPI, otherwise negative.      PHYSICAL EXAMINATION:   VITAL SIGNS:  Temperature is 98.7, pulse 84, blood pressure 107/85, O2 sat 98% on room air.   GENERAL:  No acute distress, alert and oriented x3.   CARDIOVASCULAR:  Regular rate and rhythm.   RESPIRATORY:  Clear to auscultation bilaterally.   ABDOMEN:  Moderate tenderness to palpation in all 4 quadrants with guarding and mild rebound.   EXTREMITIES:  No clubbing, cyanosis or edema.  No calf tenderness.      LABORATORY DATA:  White count 9.0, hemoglobin 12.7, hematocrit 38.5 and platelets 367.  Basic metabolic panel reveals sodium of 139, potassium 3.8, chloride 105, carbon dioxide 28, BUN 13, creatinine 0.66.  Quantitative beta hCG 510.  Blood type A positive.      IMAGING:  Pelvic ultrasound revealed  uterus 6.8 x 5.1 x 3.7 cm, endometrium 4 mm in thickness.  No intrauterine masses.  Right ovary 2.9 x 2.3 x 2.2 cm.  The ovary demonstrates normal follicular structure and flow.  There is a 4.4 x 3.3 x 2.4 cm solid and cystic mass in the right adnexa along the right ovary highly worrisome for ectopic complex heterogeneous predominantly echogenic area in the right adnexa.  Left ovary measures 3.8 x 2.1 x 2.1 cm.  It demonstrates normal follicular structure and flow.  Small amount of complex fluid in the pelvis.      ASSESSMENT AND PLAN:  Once again, this is a 29-year-old G3, P1-0-1-1, -American female who presents to the ER with severe sudden onset of pelvic and abdominal pain with known suspected recent right-sided ectopic pregnancy, status post methotrexate x2 doses.  She is currently hemodynamically stable by vitals and examination.  Her beta hCG has appropriately dropped after her second dose of methotrexate; however, given her clinical presentation with diffuse abdominal pain with rebound and guarding as well as pelvic ultrasound showing persistent complex cystic mass that is larger in size from previous ultrasound, decision made to proceed with surgical management.  Plan to proceed with laparoscopy, possible right salpingectomy, possible right salpingostomy.  The risks, benefits, alternatives of the procedure were discussed with the patient, including the risks of vascular injury, possible need for blood transfusion, the risk of visceral injury including but not limited to damage to ureter, bowel and ovary.  The risk for infection, prolonged hospitalization and possible reoperation.  I discussed with patient the possibility of right salpingostomy to salvage the tube; however, she desires a salpingectomy due to increased risk of repeat ectopic pregnancy on that side.  Discussed with the patient that should her left fallopian tube appear within normal limits, we will then proceed with right salpingectomy.   She is agreeable with this plan.  All questions were answered.  The patient has been n.p.o. since last night and OR has been notified.         LEVAR DURAN MD             D: 2020   T: 2020   MT: ADRIAN      Name:     JASWANT THOMAS   MRN:      [id number]        Account:      [id number]   :      1990        Admitted:     2020                   Document: [id number]

## 2020-02-14 NOTE — ANESTHESIA PREPROCEDURE EVALUATION
Anesthesia Pre-Procedure Evaluation    Patient: Andressa Whitt   MRN: [id number] : 1990          Preoperative Diagnosis: Ectopic pregnancy [O00.90]    Procedure(s):  SINGLE SITE LAPAROSCOPY, POSSIBLE RIGHT SALPINGOSTOMY, POSSIBLE RIGHT SALPINGECTOMY    Past Medical History:   Diagnosis Date     Uncomplicated asthma      Past Surgical History:   Procedure Laterality Date     APPENDECTOMY       ORTHOPEDIC SURGERY       Anesthesia Evaluation     .             ROS/MED HX    ENT/Pulmonary:     (+)asthma , . .    Neurologic:  - neg neurologic ROS     Cardiovascular:  - neg cardiovascular ROS       METS/Exercise Tolerance:     Hematologic:  - neg hematologic  ROS       Musculoskeletal:  - neg musculoskeletal ROS       GI/Hepatic:  - neg GI/hepatic ROS       Renal/Genitourinary:  - ROS Renal section negative       Endo: Comment: .Body mass index is 28.15 kg/m .   - neg endo ROS       Psychiatric:  - neg psychiatric ROS       Infectious Disease:  - neg infectious disease ROS       Malignancy:         Other: Comment: Ectopic pregnancy  .Lab Test        02/14/20     02/14/20     01/30/20     01/10/20                       0919          0637          1438          0016          WBC           --          9.0          7.4          13.1*         HGB          11.4*        12.7         12.6         12.6          MCV           --          92           92           91            PLT           --          367          286          275            Lab Test        20                       0637                            1438          NA           139           --          137           POTASSIUM    3.8          4.1          3.6           CHLORIDE     105           --          104           CO2          -                      BUN          -          20            CR           0.66         0.68         0.67          ANIONGAP     6             --          3            "  [place]          9.2           --          9.2           GLC          123*         109*         91               (+) Possibly pregnant                         Physical Exam  Normal systems: cardiovascular and pulmonary    Airway   Mallampati: II    Dental     Cardiovascular   Rhythm and rate: regular and normal      Pulmonary    breath sounds clear to auscultation            Lab Results   Component Value Date    WBC 9.0 02/14/2020    HGB 11.4 (L) 02/14/2020    HCT 38.5 02/14/2020     02/14/2020     02/14/2020    POTASSIUM 3.8 02/14/2020    CHLORIDE 105 02/14/2020    CO2 28 02/14/2020    BUN 13 02/14/2020    CR 0.66 02/14/2020     (H) 02/14/2020    [place] 9.2 02/14/2020    ALBUMIN 4.1 01/30/2020    PROTTOTAL 7.8 01/30/2020    ALT 22 02/05/2020    AST 21 02/05/2020    ALKPHOS 68 01/30/2020    BILITOTAL 0.5 01/30/2020       Preop Vitals  BP Readings from Last 3 Encounters:   02/14/20 107/85   02/06/20 101/65   02/02/20 105/68    Pulse Readings from Last 3 Encounters:   02/14/20 84   02/06/20 75   02/02/20 75      Resp Readings from Last 3 Encounters:   02/14/20 22   02/02/20 18   01/30/20 20    SpO2 Readings from Last 3 Encounters:   02/14/20 98%   02/06/20 96%   02/02/20 97%      Temp Readings from Last 1 Encounters:   02/14/20 98.7  F (37.1  C) (Temporal)    Ht Readings from Last 1 Encounters:   02/14/20 1.549 m (5' 1\")      Wt Readings from Last 1 Encounters:   02/14/20 67.6 kg (149 lb)    Estimated body mass index is 28.15 kg/m  as calculated from the following:    Height as of this encounter: 1.549 m (5' 1\").    Weight as of this encounter: 67.6 kg (149 lb).       Anesthesia Plan      History & Physical Review  History and physical reviewed and following examination; no interval change.    ASA Status:  2 .        Plan for General and ETT with Intravenous induction. Maintenance will be Inhalation and Balanced.    PONV prophylaxis:  Ondansetron (or other 5HT-3) and Dexamethasone or Solumedrol   "     Postoperative Care  Postoperative pain management:  IV analgesics, Oral pain medications and Multi-modal analgesia.      Consents  Anesthetic plan, risks, benefits and alternatives discussed with:  Patient or representative..                 Jcarlos Berg DO                    .

## 2020-02-15 NOTE — OP NOTE
Procedure Date: 02/14/2020      PREOPERATIVE DIAGNOSES:   1.  Acute pelvic pain.   2.  Suspected ruptured ectopic pregnancy.      POSTOPERATIVE DIAGNOSES:   1.  Acute pelvic pain.   2.  Suspected ruptured ectopic pregnancy.      PROCEDURE:  Single-incision laparoscopic right salpingectomy, lysis of adhesions and excision of paratubal cyst.      SURGEON:  Mamadou Ratliff MD      ASSISTANT:  AFRICA Ritter      ANESTHESIA:  General.      IV FLUIDS:  Please see anesthesia MAR.      ESTIMATED BLOOD LOSS:  Less than 30.      URINE OUTPUT:  600 mL clear.      SPECIMEN DISPOSITION:  Right fallopian tube with ectopic pregnancy as well as right what appeared to be paratubal cyst sent to Pathology.      CONDITION:  Stable.      FINDINGS:  Revealed an enlarged right fallopian tube that appeared to be ruptured at its distal end, adhesed to the right pelvic sidewall and posterior uterus with organized blood clot surrounding the distal end.  Right ovary appeared to be densely adhered to the right pelvic sidewall.  There were also adhesions between the descending colon and the posterior uterus.  Left fallopian tube and ovary appeared within normal limits.      INDICATION AND CONSENT:  The patient is a 29-year-old G3, P1-0-1-1 -American female who presented to the ER on the morning of with sudden onset of severe pelvic and abdominal pain.  Of note, her history was remarkable for a recent diagnosis of right ectopic pregnancy, for which she had originally presented to the ER back on 01/30/2020.  At that time she was deemed a candidate for methotrexate, and she underwent methotrexate therapy, a 2-dose regimen.  Despite appropriate drop in her beta hCG, upon arrival to the ER this morning she was found to have diffuse abdominal pain with rebound and guarding.  Her hemoglobin was stable.  However, repeat pelvic ultrasound did reveal a 4.4 cm complex cystic mass in the right adnexal region, which was increased in size from  previous imaging.  Due to her clinical presentation and ultrasound findings, there was concern for a possible ruptured ectopic pregnancy and failed methotrexate therapy.  Decision was made to proceed with surgical management.  The risks, benefits, alternatives of surgery were discussed with the patient including risks of vascular injury, possible need for transfusion, the risk of visceral injury including but not limited to damage to bowel, bladder, uterus, risk for infection, prolonged hospitalization and possible reoperation.  Discussed with the patient that given suspicion for ruptured ectopic pregnancy, there is risk for right salpingectomy.  Discussed with the patient also that if fallopian tube appeared to be salvageable, the option of right salpingostomy was offered, however with future risk of repeat ectopic pregnancy on that side.  The patient desired salpingectomy to avoid this risk.  All questions were answered, and consent was signed.      DESCRIPTION OF PROCEDURE:  The patient was taken to the operating room, where general anesthesia was administered without difficulty.  She was prepped and draped in dorsal lithotomy position with Yellofin stirrups.  A Urena catheter was placed to drain the bladder.  A speculum was then placed in the vagina, and the anterior lip of the cervix was grasped with a single-toothed tenaculum.  The cervix was gently dilated in order to accommodate the Ovelinlka uterine manipulator device, which was inserted without difficulty.  The tenaculum was then removed.  Attention was then paid to the abdomen.  Using a scalpel, incision was made in the infraumbilical fold and carried down to the fascia with the Bovie electrocautery device and blunt dissection.  The fascia was tagged with 0 Vicryl suture and elevated and entered sharply with Lilly scissors.  Digital exam revealed no adhesions beneath the umbilical site.  The Olympus TriPort was introduced into the intra-abdominal cavity, and  pneumoperitoneum was achieved with CO2 gas to a level of 15 mmHg.  The Endoeye laparoscope was then introduced into the abdomen.  The patient was placed in Trendelenburg positioning.  Laparoscopic findings revealed what is noted above, including what appeared to be a significantly dilated right fallopian tube that appeared to be ruptured at its distal end, fairly densely adhered to the right adnexal region including the right ovary and posterior uterus.  Left fallopian tube and ovary appeared within normal limits.  Using the suction , hydrodissection was performed as well as blunt dissection in order to free the distal aspect of the right fallopian tube from the posterior aspect of the uterus and the right pelvic sidewall.  At this time, the right ovary was able to be more clearly visualized and was noted to be densely adhered to the right pelvic sidewall.  There also appeared to be a simple-appearing cyst adjacent to the right ovary, which was also adhered to the posterior aspect of the uterus.  There were colonic adhesions to the lower posterior segment of the uterus, which was also bluntly dissected with a suction .  Then, using atraumatic grasper, the right fallopian tube was grasped at its proximal aspect and elevated away from the pelvic sidewall.  Using the Thunderbeat device, the right fallopian tube was clamped, coagulated and cut and excised without further difficulty.  Reinspection of the excision site revealed excellent hemostasis.  The right what appeared to be a paratubal cyst was also able to be  from the surrounding tissues and excised intact.  At this point, pneumoperitoneum was then released, and reinspection of excision site revealed excellent hemostasis.  The right fallopian tube as well as paratubal cysts were then placed within the Endocatch bag and removed from the abdomen through the Olympus TriPort site.  The specimens were then sent to Pathology.  Pneumoperitoneum  was re-achieved, and reinspection of the right adnexa revealed slight oozing along the raw surfaces where lysis of adhesions was performed, however no other bleeding.  Surgicel was distributed along the excision site, and reinspection once again revealed excellent hemostasis.  All instruments were then removed from the abdomen, and pneumoperitoneum was fully released.  The Olympus TriPort was removed from the abdomen.  The umbilical fascial incision was closed with 0 Vicryl suture in a continuous running fashion.  The skin was then closed with 4-0 Monocryl suture in a subcuticular fashion.  Marcaine 0.5% was injected circumferentially around the incision site for further analgesia.  Urena as well as Hulka uterine manipulator were removed at the end of the procedure.  All sponge and instrument counts were correct x 2.  The patient tolerated the procedure well and was returned to recovery room in stable condition.         LEVAR DURAN MD             D: 2020   T: 2020   MT: NADYA      Name:     JASWANT THOMAS   MRN:      [id number]        Account:        [id number]   :      1990           Procedure Date: 2020      Document: [id number]

## 2020-02-19 LAB — COPATH REPORT: NORMAL

## 2020-08-04 LAB
HBV SURFACE AG SERPL QL IA: NEGATIVE
HIV 1+2 AB+HIV1 P24 AG SERPL QL IA: NORMAL
RUBELLA ANTIBODY IGG QUANTITATIVE: NORMAL IU/ML
TREPONEMA ANTIBODIES: NONREACTIVE

## 2021-01-12 LAB — GROUP B STREP PCR: NEGATIVE

## 2021-02-01 ENCOUNTER — HOSPITAL ENCOUNTER (OUTPATIENT)
Facility: CLINIC | Age: 31
Discharge: HOME | End: 2021-02-01
Attending: OBSTETRICS & GYNECOLOGY | Admitting: OBSTETRICS & GYNECOLOGY
Payer: COMMERCIAL

## 2021-02-01 VITALS — TEMPERATURE: 97.9 F | SYSTOLIC BLOOD PRESSURE: 128 MMHG | DIASTOLIC BLOOD PRESSURE: 86 MMHG

## 2021-02-01 PROBLEM — Z36.89 ENCOUNTER FOR TRIAGE IN PREGNANT PATIENT: Status: ACTIVE | Noted: 2021-02-01

## 2021-02-01 PROCEDURE — 258N000003 HC RX IP 258 OP 636: Performed by: OBSTETRICS & GYNECOLOGY

## 2021-02-01 PROCEDURE — 250N000013 HC RX MED GY IP 250 OP 250 PS 637: Performed by: OBSTETRICS & GYNECOLOGY

## 2021-02-01 PROCEDURE — 96360 HYDRATION IV INFUSION INIT: CPT

## 2021-02-01 PROCEDURE — G0463 HOSPITAL OUTPT CLINIC VISIT: HCPCS | Mod: 25

## 2021-02-01 PROCEDURE — 999N000105 HC STATISTIC NO DOCUMENTATION TO SUPPORT CHARGE

## 2021-02-01 RX ORDER — FERROUS SULFATE 325(65) MG
325 TABLET ORAL
COMMUNITY

## 2021-02-01 RX ORDER — SERTRALINE HYDROCHLORIDE 25 MG/1
25 TABLET, FILM COATED ORAL DAILY
COMMUNITY

## 2021-02-01 RX ORDER — HYDROXYZINE HYDROCHLORIDE 50 MG/1
50-100 TABLET, FILM COATED ORAL EVERY 6 HOURS PRN
Status: DISCONTINUED | OUTPATIENT
Start: 2021-02-01 | End: 2021-02-01 | Stop reason: HOSPADM

## 2021-02-01 RX ORDER — HYDROXYZINE HYDROCHLORIDE 50 MG/1
50 TABLET, FILM COATED ORAL EVERY 6 HOURS PRN
Status: DISCONTINUED | OUTPATIENT
Start: 2021-02-01 | End: 2021-02-01 | Stop reason: HOSPADM

## 2021-02-01 RX ADMIN — HYDROXYZINE HYDROCHLORIDE 50 MG: 50 TABLET, FILM COATED ORAL at 05:48

## 2021-02-01 RX ADMIN — HYDROXYZINE HYDROCHLORIDE 50 MG: 50 TABLET, FILM COATED ORAL at 04:39

## 2021-02-01 RX ADMIN — SODIUM CHLORIDE, POTASSIUM CHLORIDE, SODIUM LACTATE AND CALCIUM CHLORIDE 500 ML: 600; 310; 30; 20 INJECTION, SOLUTION INTRAVENOUS at 04:38

## 2021-02-01 NOTE — PROVIDER NOTIFICATION
02/01/21 0533   Provider Notification   Provider Name/Title Dr. TRUDY Vickers   Method of Notification Phone   Request Evaluate - Remote   Notification Reason SVE;Other (Comment)     OB called for update. SVE unchanged. Patient states pain is still in lower abdomen. Patient given 3 options per OB, 1. Patient may take 50mg Atarax and go home and rest and may come back or call clinic if pain worsens. 2. Patient can make appointment for Tuesday or Wednesday and set up induction. 3. OB will come evaluate patient. After talking to patient, patient would like to take Atarax and go home and sleep. Discharge orders received.

## 2021-02-01 NOTE — PLAN OF CARE
Data: Patient assessed in the [facility] for uterine contractions.  Cervical exam posterior, dilated to 1, effaced 30-50% and firm.  Membranes intact.  Contractions 2-18 minutes.  Action:  Presumed adequate fetal oxygenation documented (see flow record). Discharge instructions reviewed.  Patient instructed to report change in fetal movement, vaginal leaking of fluid or bleeding, abdominal pain, or any concerns related to the pregnancy to her nurse/physician.    Response: Orders to discharge home per Myla Vickers.  Patient verbalized understanding of education and verbalized agreement with plan. Discharged to home at 0548.

## 2021-02-01 NOTE — DISCHARGE INSTRUCTIONS
Discharge Instruction for Undelivered Patients      You were seen for: Labor Assessment  We Consulted: Dr. TRUDY Vickers  You had (Test or Medicine):fetal monitoring, cervical exam, IV fluids, Atarax (100mg)     Diet:   Drink 8 to 12 glasses of liquids (milk, juice, water) every day.  You may eat meals and snacks.     Activity:  Call your doctor or nurse midwife if your baby is moving less than usual.     Call your provider if you notice:  Swelling in your face or increased swelling in your hands or legs.  Headaches that are not relieved by Tylenol (acetaminophen).  Changes in your vision (blurring: seeing spots or stars.)  Nausea (sick to your stomach) and vomiting (throwing up).   Weight gain of 5 pounds or more per week.  Heartburn that doesn't go away.  Signs of bladder infection: pain when you urinate (use the toilet), need to go more often and more urgently.  The bag of gruber (rupture of membranes) breaks, or you notice leaking in your underwear.  Bright red blood in your underwear.  Abdominal (lower belly) or stomach pain.  For first baby: Contractions (tightening) less than 5 minutes apart for one hour or more.  Second (plus) baby: Contractions (tightening) less than 10 minutes apart and getting stronger.  *If less than 34 weeks: Contractions (tightenings) more than 6 times in one hour.  Increase or change in vaginal discharge (note the color and amount)  Other:     Follow-up:  As scheduled in the clinic

## 2021-02-01 NOTE — PLAN OF CARE
Data: Patient presented to [facility]: 2021  3:21 AM.  Reason for maternal/fetal assessment is uterine contractions. Patient reports contractions starting around 0230.  Patient is a .  Prenatal record reviewed. Pregnancy has been uncomplicated..  Gestational Age 38w6d. VSS. Fetal movement present. Patient denies leaking of vaginal fluid/rupture of membranes, vaginal bleeding, abdominal pain, pelvic pressure, nausea, vomiting, headache, visual disturbances, epigastric or URQ pain, significant edema. Support person is present.   Action: Verbal consent for EFM. Triage assessment completed. Bill of rights reviewed.  Response: Patient verbalized agreement with plan. Will contact Dr Myla Vickers with update and further orders.

## 2021-02-01 NOTE — PROVIDER NOTIFICATION
02/01/21 0516   Provider Notification   Provider Name/Title Dr. TRUDY Vickers   Method of Notification Phone   Request Evaluate - Remote   Notification Reason Status Update     OB updated Fluid bolus given, Atarax given. Patient able to sleep about 20 minutes, and woke when IV was beeping. Patient states the pain is still present. Patient now states pain comes and goes. Patient is constantly in pain. Order received to recheck cervix. OB will call back for update.

## 2021-02-01 NOTE — PROVIDER NOTIFICATION
21 0421   Provider Notification   Provider Name/Title Dr. TRUDY Vickers   Method of Notification Phone   Request Evaluate - Remote   Notification Reason Patient Arrived;Other (Comment);Pain     OB updated  38.6w here for r/o labor. SVE 1.5/-2 at 0330. Patient states contractions started at 0230. Patient called asking if pain is suppose to be so bad. SVE recheck unchanged. Patient states her pain is constant in lower abdomen. Patient is bharat every 2-7 minutes, Category 1 tracing. Patient seems very uncomfortable. Order received for 500mL fluid bolus and 50-100mg oral Atarax. Call with any further updates.

## 2021-02-02 ENCOUNTER — ANESTHESIA EVENT (OUTPATIENT)
Dept: OBGYN | Facility: CLINIC | Age: 31
End: 2021-02-02
Payer: COMMERCIAL

## 2021-02-02 ENCOUNTER — ANESTHESIA (OUTPATIENT)
Dept: OBGYN | Facility: CLINIC | Age: 31
End: 2021-02-02
Payer: COMMERCIAL

## 2021-02-02 ENCOUNTER — HOSPITAL ENCOUNTER (INPATIENT)
Facility: CLINIC | Age: 31
LOS: 1 days | Discharge: HOME HEALTH | End: 2021-02-03
Attending: OBSTETRICS & GYNECOLOGY | Admitting: OBSTETRICS & GYNECOLOGY
Payer: COMMERCIAL

## 2021-02-02 LAB
ABO + RH BLD: NORMAL
ABO + RH BLD: NORMAL
ALT SERPL W P-5'-P-CCNC: 17 U/L (ref 0–50)
AST SERPL W P-5'-P-CCNC: 20 U/L (ref 0–45)
BLD GP AB SCN SERPL QL: NORMAL
BLOOD BANK CMNT PATIENT-IMP: NORMAL
CREAT SERPL-MCNC: 0.68 MG/DL (ref 0.52–1.04)
ERYTHROCYTE [DISTWIDTH] IN BLOOD BY AUTOMATED COUNT: 14.3 % (ref 10–15)
GFR SERPL CREATININE-BSD FRML MDRD: >90 ML/MIN/{1.73_M2}
HCT VFR BLD AUTO: 40.1 % (ref 35–47)
HGB BLD-MCNC: 13.3 G/DL (ref 11.7–15.7)
LABORATORY COMMENT REPORT: NORMAL
MCH RBC QN AUTO: 30.8 PG (ref 26.5–33)
MCHC RBC AUTO-ENTMCNC: 33.2 G/DL (ref 31.5–36.5)
MCV RBC AUTO: 93 FL (ref 78–100)
PLATELET # BLD AUTO: 264 10E9/L (ref 150–450)
RBC # BLD AUTO: 4.32 10E12/L (ref 3.8–5.2)
SARS-COV-2 RNA RESP QL NAA+PROBE: NEGATIVE
SPECIMEN EXP DATE BLD: NORMAL
SPECIMEN SOURCE: NORMAL
T PALLIDUM AB SER QL: NONREACTIVE
WBC # BLD AUTO: 12.4 10E9/L (ref 4–11)

## 2021-02-02 PROCEDURE — 86900 BLOOD TYPING SEROLOGIC ABO: CPT | Performed by: OBSTETRICS & GYNECOLOGY

## 2021-02-02 PROCEDURE — 3E0R3BZ INTRODUCTION OF ANESTHETIC AGENT INTO SPINAL CANAL, PERCUTANEOUS APPROACH: ICD-10-PCS | Performed by: ANESTHESIOLOGY

## 2021-02-02 PROCEDURE — 120N000001 HC R&B MED SURG/OB

## 2021-02-02 PROCEDURE — 86850 RBC ANTIBODY SCREEN: CPT | Performed by: OBSTETRICS & GYNECOLOGY

## 2021-02-02 PROCEDURE — 370N000003 HC ANESTHESIA WARD SERVICE

## 2021-02-02 PROCEDURE — 87635 SARS-COV-2 COVID-19 AMP PRB: CPT | Performed by: OBSTETRICS & GYNECOLOGY

## 2021-02-02 PROCEDURE — 250N000011 HC RX IP 250 OP 636: Performed by: OBSTETRICS & GYNECOLOGY

## 2021-02-02 PROCEDURE — 250N000013 HC RX MED GY IP 250 OP 250 PS 637: Performed by: OBSTETRICS & GYNECOLOGY

## 2021-02-02 PROCEDURE — 10907ZC DRAINAGE OF AMNIOTIC FLUID, THERAPEUTIC FROM PRODUCTS OF CONCEPTION, VIA NATURAL OR ARTIFICIAL OPENING: ICD-10-PCS | Performed by: OBSTETRICS & GYNECOLOGY

## 2021-02-02 PROCEDURE — 82565 ASSAY OF CREATININE: CPT | Performed by: OBSTETRICS & GYNECOLOGY

## 2021-02-02 PROCEDURE — 84460 ALANINE AMINO (ALT) (SGPT): CPT | Performed by: OBSTETRICS & GYNECOLOGY

## 2021-02-02 PROCEDURE — 250N000009 HC RX 250: Performed by: OBSTETRICS & GYNECOLOGY

## 2021-02-02 PROCEDURE — 84450 TRANSFERASE (AST) (SGOT): CPT | Performed by: OBSTETRICS & GYNECOLOGY

## 2021-02-02 PROCEDURE — 250N000011 HC RX IP 250 OP 636: Performed by: ANESTHESIOLOGY

## 2021-02-02 PROCEDURE — 85027 COMPLETE CBC AUTOMATED: CPT | Performed by: OBSTETRICS & GYNECOLOGY

## 2021-02-02 PROCEDURE — 00HU33Z INSERTION OF INFUSION DEVICE INTO SPINAL CANAL, PERCUTANEOUS APPROACH: ICD-10-PCS | Performed by: ANESTHESIOLOGY

## 2021-02-02 PROCEDURE — 258N000003 HC RX IP 258 OP 636: Performed by: ANESTHESIOLOGY

## 2021-02-02 PROCEDURE — 86780 TREPONEMA PALLIDUM: CPT | Performed by: OBSTETRICS & GYNECOLOGY

## 2021-02-02 PROCEDURE — 86901 BLOOD TYPING SEROLOGIC RH(D): CPT | Performed by: OBSTETRICS & GYNECOLOGY

## 2021-02-02 PROCEDURE — 999N000011 HC STATISTIC ANESTHESIA CASE

## 2021-02-02 PROCEDURE — 722N000001 HC LABOR CARE VAGINAL DELIVERY SINGLE

## 2021-02-02 PROCEDURE — 258N000003 HC RX IP 258 OP 636: Performed by: OBSTETRICS & GYNECOLOGY

## 2021-02-02 RX ORDER — NALOXONE HYDROCHLORIDE 0.4 MG/ML
0.2 INJECTION, SOLUTION INTRAMUSCULAR; INTRAVENOUS; SUBCUTANEOUS
Status: DISCONTINUED | OUTPATIENT
Start: 2021-02-02 | End: 2021-02-02

## 2021-02-02 RX ORDER — TRANEXAMIC ACID 10 MG/ML
1 INJECTION, SOLUTION INTRAVENOUS EVERY 30 MIN PRN
Status: DISCONTINUED | OUTPATIENT
Start: 2021-02-02 | End: 2021-02-03 | Stop reason: HOSPADM

## 2021-02-02 RX ORDER — OXYTOCIN/0.9 % SODIUM CHLORIDE 30/500 ML
340 PLASTIC BAG, INJECTION (ML) INTRAVENOUS CONTINUOUS PRN
Status: DISCONTINUED | OUTPATIENT
Start: 2021-02-02 | End: 2021-02-03 | Stop reason: HOSPADM

## 2021-02-02 RX ORDER — NALBUPHINE HYDROCHLORIDE 10 MG/ML
2.5-5 INJECTION, SOLUTION INTRAMUSCULAR; INTRAVENOUS; SUBCUTANEOUS EVERY 6 HOURS PRN
Status: DISCONTINUED | OUTPATIENT
Start: 2021-02-02 | End: 2021-02-03 | Stop reason: HOSPADM

## 2021-02-02 RX ORDER — OXYTOCIN 10 [USP'U]/ML
10 INJECTION, SOLUTION INTRAMUSCULAR; INTRAVENOUS
Status: DISCONTINUED | OUTPATIENT
Start: 2021-02-02 | End: 2021-02-02

## 2021-02-02 RX ORDER — SODIUM CHLORIDE, SODIUM LACTATE, POTASSIUM CHLORIDE, CALCIUM CHLORIDE 600; 310; 30; 20 MG/100ML; MG/100ML; MG/100ML; MG/100ML
INJECTION, SOLUTION INTRAVENOUS CONTINUOUS
Status: DISCONTINUED | OUTPATIENT
Start: 2021-02-02 | End: 2021-02-02

## 2021-02-02 RX ORDER — METHYLERGONOVINE MALEATE 0.2 MG/ML
200 INJECTION INTRAVENOUS
Status: DISCONTINUED | OUTPATIENT
Start: 2021-02-02 | End: 2021-02-02

## 2021-02-02 RX ORDER — TRANEXAMIC ACID 10 MG/ML
1 INJECTION, SOLUTION INTRAVENOUS EVERY 30 MIN PRN
Status: DISCONTINUED | OUTPATIENT
Start: 2021-02-02 | End: 2021-02-02

## 2021-02-02 RX ORDER — ONDANSETRON 2 MG/ML
4 INJECTION INTRAMUSCULAR; INTRAVENOUS EVERY 6 HOURS PRN
Status: DISCONTINUED | OUTPATIENT
Start: 2021-02-02 | End: 2021-02-03 | Stop reason: HOSPADM

## 2021-02-02 RX ORDER — CARBOPROST TROMETHAMINE 250 UG/ML
250 INJECTION, SOLUTION INTRAMUSCULAR
Status: DISCONTINUED | OUTPATIENT
Start: 2021-02-02 | End: 2021-02-02

## 2021-02-02 RX ORDER — ONDANSETRON 4 MG/1
4 TABLET, ORALLY DISINTEGRATING ORAL EVERY 6 HOURS PRN
Status: DISCONTINUED | OUTPATIENT
Start: 2021-02-02 | End: 2021-02-03 | Stop reason: HOSPADM

## 2021-02-02 RX ORDER — OXYTOCIN 10 [USP'U]/ML
10 INJECTION, SOLUTION INTRAMUSCULAR; INTRAVENOUS
Status: DISCONTINUED | OUTPATIENT
Start: 2021-02-02 | End: 2021-02-03 | Stop reason: HOSPADM

## 2021-02-02 RX ORDER — ACETAMINOPHEN 325 MG/1
650 TABLET ORAL EVERY 4 HOURS PRN
Status: DISCONTINUED | OUTPATIENT
Start: 2021-02-02 | End: 2021-02-03 | Stop reason: HOSPADM

## 2021-02-02 RX ORDER — FENTANYL CITRATE-0.9 % NACL/PF 10 MCG/ML
100 PLASTIC BAG, INJECTION (ML) INTRAVENOUS EVERY 5 MIN PRN
Status: DISCONTINUED | OUTPATIENT
Start: 2021-02-02 | End: 2021-02-03 | Stop reason: HOSPADM

## 2021-02-02 RX ORDER — MODIFIED LANOLIN
OINTMENT (GRAM) TOPICAL
Status: DISCONTINUED | OUTPATIENT
Start: 2021-02-02 | End: 2021-02-03 | Stop reason: HOSPADM

## 2021-02-02 RX ORDER — ONDANSETRON 2 MG/ML
4 INJECTION INTRAMUSCULAR; INTRAVENOUS EVERY 6 HOURS PRN
Status: DISCONTINUED | OUTPATIENT
Start: 2021-02-02 | End: 2021-02-02

## 2021-02-02 RX ORDER — FENTANYL CITRATE 50 UG/ML
50-100 INJECTION, SOLUTION INTRAMUSCULAR; INTRAVENOUS
Status: DISCONTINUED | OUTPATIENT
Start: 2021-02-02 | End: 2021-02-02

## 2021-02-02 RX ORDER — IBUPROFEN 800 MG/1
800 TABLET, FILM COATED ORAL EVERY 6 HOURS PRN
Status: DISCONTINUED | OUTPATIENT
Start: 2021-02-02 | End: 2021-02-03 | Stop reason: HOSPADM

## 2021-02-02 RX ORDER — OXYTOCIN/0.9 % SODIUM CHLORIDE 30/500 ML
100 PLASTIC BAG, INJECTION (ML) INTRAVENOUS CONTINUOUS
Status: DISCONTINUED | OUTPATIENT
Start: 2021-02-02 | End: 2021-02-03 | Stop reason: HOSPADM

## 2021-02-02 RX ORDER — NALOXONE HYDROCHLORIDE 0.4 MG/ML
0.4 INJECTION, SOLUTION INTRAMUSCULAR; INTRAVENOUS; SUBCUTANEOUS
Status: DISCONTINUED | OUTPATIENT
Start: 2021-02-02 | End: 2021-02-02

## 2021-02-02 RX ORDER — HYDROCORTISONE 2.5 %
CREAM (GRAM) TOPICAL 3 TIMES DAILY PRN
Status: DISCONTINUED | OUTPATIENT
Start: 2021-02-02 | End: 2021-02-03 | Stop reason: HOSPADM

## 2021-02-02 RX ORDER — ACETAMINOPHEN 325 MG/1
650 TABLET ORAL EVERY 4 HOURS PRN
Status: DISCONTINUED | OUTPATIENT
Start: 2021-02-02 | End: 2021-02-02

## 2021-02-02 RX ORDER — OXYCODONE AND ACETAMINOPHEN 5; 325 MG/1; MG/1
1 TABLET ORAL
Status: DISCONTINUED | OUTPATIENT
Start: 2021-02-02 | End: 2021-02-02

## 2021-02-02 RX ORDER — IBUPROFEN 800 MG/1
800 TABLET, FILM COATED ORAL
Status: DISCONTINUED | OUTPATIENT
Start: 2021-02-02 | End: 2021-02-02

## 2021-02-02 RX ORDER — BISACODYL 10 MG
10 SUPPOSITORY, RECTAL RECTAL DAILY PRN
Status: DISCONTINUED | OUTPATIENT
Start: 2021-02-04 | End: 2021-02-03 | Stop reason: HOSPADM

## 2021-02-02 RX ORDER — AMOXICILLIN 250 MG
2 CAPSULE ORAL 2 TIMES DAILY
Status: DISCONTINUED | OUTPATIENT
Start: 2021-02-02 | End: 2021-02-03 | Stop reason: HOSPADM

## 2021-02-02 RX ORDER — OXYTOCIN/0.9 % SODIUM CHLORIDE 30/500 ML
100-340 PLASTIC BAG, INJECTION (ML) INTRAVENOUS CONTINUOUS PRN
Status: DISCONTINUED | OUTPATIENT
Start: 2021-02-02 | End: 2021-02-02

## 2021-02-02 RX ORDER — LIDOCAINE HYDROCHLORIDE AND EPINEPHRINE 15; 5 MG/ML; UG/ML
3 INJECTION, SOLUTION EPIDURAL
Status: DISCONTINUED | OUTPATIENT
Start: 2021-02-02 | End: 2021-02-03

## 2021-02-02 RX ORDER — AMOXICILLIN 250 MG
1 CAPSULE ORAL 2 TIMES DAILY
Status: DISCONTINUED | OUTPATIENT
Start: 2021-02-02 | End: 2021-02-03 | Stop reason: HOSPADM

## 2021-02-02 RX ADMIN — Medication 340 ML/HR: at 12:43

## 2021-02-02 RX ADMIN — DOCUSATE SODIUM 50 MG AND SENNOSIDES 8.6 MG 1 TABLET: 8.6; 5 TABLET, FILM COATED ORAL at 21:17

## 2021-02-02 RX ADMIN — SODIUM CHLORIDE, POTASSIUM CHLORIDE, SODIUM LACTATE AND CALCIUM CHLORIDE 1000 ML: 600; 310; 30; 20 INJECTION, SOLUTION INTRAVENOUS at 10:16

## 2021-02-02 RX ADMIN — Medication 100 ML/HR: at 13:18

## 2021-02-02 RX ADMIN — IBUPROFEN 800 MG: 800 TABLET ORAL at 21:17

## 2021-02-02 RX ADMIN — IBUPROFEN 800 MG: 800 TABLET ORAL at 13:05

## 2021-02-02 RX ADMIN — FENTANYL CITRATE 100 MCG: 50 INJECTION, SOLUTION INTRAMUSCULAR; INTRAVENOUS at 10:34

## 2021-02-02 RX ADMIN — FENTANYL CITRATE: 50 INJECTION, SOLUTION INTRAMUSCULAR; INTRAVENOUS at 10:42

## 2021-02-02 ASSESSMENT — ACTIVITIES OF DAILY LIVING (ADL)
TOILETING_ISSUES: NO
FALL_HISTORY_WITHIN_LAST_SIX_MONTHS: NO

## 2021-02-02 ASSESSMENT — MIFFLIN-ST. JEOR: SCORE: 1560.04

## 2021-02-02 NOTE — PROVIDER NOTIFICATION
02/02/21 1004   Provider Notification   Provider Name/Title Dr. Leavitt   Method of Notification Phone   Request Evaluate - Remote   Notification Reason Patient Arrived;SVE;Maternal Vital Sign Change  (elevated BPs)     Notified Dr. Leavitt of patient arrival in active labor.  SVE 5/80/-2, membranes intact.  Patient desires epidural for labor pain relief.  Notified of elevated BP of 180/100, will continue to monitor.

## 2021-02-02 NOTE — ANESTHESIA PREPROCEDURE EVALUATION
Anesthesia Pre-Procedure Evaluation    Patient: Andressa Whitt   MRN: [id number] : 1990        Preoperative Diagnosis: * No surgery found *   Procedure :      Past Medical History:   Diagnosis Date     Depressive disorder      Uncomplicated asthma       Past Surgical History:   Procedure Laterality Date     APPENDECTOMY       LAPAROSCOPIC EVACUATION ECTOPIC PREGNANCY Right 2020    Procedure: SINGLE SITE LAPAROSCOPY, RIGHT SALPINGECTOMY, LYSIS OF ADHESIONS, REMOVAL OF PARATUBAL CYST;  Surgeon: Mamadou Ratliff MD;  Location: [facility] OR     ORTHOPEDIC SURGERY        No Known Allergies   Social History     Tobacco Use     Smoking status: Never Smoker     Smokeless tobacco: Never Used   Substance Use Topics     Alcohol use: Not Currently      Wt Readings from Last 1 Encounters:   21 90.3 kg (199 lb)        Anesthesia Evaluation   Pt has had prior anesthetic.         ROS/MED HX  ENT/Pulmonary:     (+) asthma     Neurologic:       Cardiovascular:  - neg cardiovascular ROS     METS/Exercise Tolerance:     Hematologic:       Musculoskeletal:       GI/Hepatic:       Renal/Genitourinary:       Endo:       Psychiatric/Substance Use:       Infectious Disease:       Malignancy:       Other:            Physical Exam    Airway        Mallampati: III   TM distance: > 3 FB   Neck ROM: full     Respiratory Devices and Support         Dental           Cardiovascular             Pulmonary                   OUTSIDE LABS:  CBC:   Lab Results   Component Value Date    WBC 12.4 (H) 2021    WBC 9.0 2020    HGB 13.3 2021    HGB 11.4 (L) 2020    HCT 40.1 2021    HCT 38.5 2020     2021     2020     BMP:   Lab Results   Component Value Date     2020     2020    POTASSIUM 3.8 2020    POTASSIUM 4.1 2020    CHLORIDE 105 2020    CHLORIDE 104 2020    CO2 28 2020    CO2 30 2020    BUN 13 2020    BUN 20  01/30/2020    CR 0.68 02/02/2021    CR 0.66 02/14/2020     (H) 02/14/2020     (H) 02/05/2020     COAGS: No results found for: PTT, INR, FIBR  POC: No results found for: BGM, HCG, HCGS  HEPATIC:   Lab Results   Component Value Date    ALBUMIN 4.1 01/30/2020    PROTTOTAL 7.8 01/30/2020    ALT 17 02/02/2021    AST 20 02/02/2021    ALKPHOS 68 01/30/2020    BILITOTAL 0.5 01/30/2020     OTHER:   Lab Results   Component Value Date    [place] 9.2 02/14/2020       Anesthesia Plan     History & Physical Review      ASA Status:  2.   Plan for Epidural            Consents  Anesthesia Plan(s) and associated risks, benefits, and realistic alternatives discussed.    Questions answered and patient/representative(s) expressed understanding.    Discussed with:  Patient.             Postoperative Care            Randy Dhaliwal MD

## 2021-02-02 NOTE — ANESTHESIA PROCEDURE NOTES
Pre-Procedure   Staff -   Performed By: Anesthesiologist  Comments:  Pre-Procedure  Performed by Randy Dhaliwal MD  Location: OB.      PreAnesthestic Checklist: patient identified, IV checked, risks and benefits discussed, informed consent obtained, monitors and equipment checked, pre-op evaluation and at physician/surgeon's request.    Timeout   Correct Patient: Yes  Correct Procedure: Epidural catheter placement  Correct Site: Yes   Correct Position: Yes    Procedure Documentation  Procedure:   Epidural catheter block for Labor    Patient currently in labor and she and OBMD request a labor epidural to control her labor pains. Patient was interviewed and examined. Procedure and risks including but not limited to bleeding, infection, nerve injury, paralysis, PDPH, and inadequate block requiring intervention discussed with patient. Questions answered. This epidural is to be placed in anticipation of vaginal delivery.  She consents to the epidural procedure.  Time-out was performed.  I or my partners remain immediately available for management of any issues or complications and will monitor at appropriate intervals.  Procedure: Patient sitting. Betadine prep x 3. Sterile drape applied.  Mask and gloves used.  Lidocaine 1%  local infiltration at L 3-4.  17 G. Tuohy needle at L3-4 by loss of resistance into epidural space.  No CSF, paresthesia or blood. 1.5 % Lidocaine with 1:200,000 Epinephrine 5cc test dose. Epidural catheter inserted w/o resistance to 5 cm in epidural space. Then 0.125% bupivicaine with fentanyl 2 mcg/ml 12 cc with NS 5 cc.  Aspiration negative for blood and CSF.   Negative for neuro change, paresthesia or symptoms of intravascular injection or intrathecal injection.  Infusion orders written and infusion of 0.125% bupivicaine with fentanyl 2 mcg/ml at 10cc per hour started.    Randy Dhaliwal MD

## 2021-02-02 NOTE — PROGRESS NOTES
LABOR PROGRESS NOTE  Vitals were reviewed  spontaneous    Cervical Exam 7/C/0  Artificialclear fluid  Uterine activity:frequency q 2 minutes  Fetal Status:Tier 1 (normal)    Impression: Active labor  Plan: Anticipate

## 2021-02-02 NOTE — L&D DELIVERY NOTE
Delivery Date:  2021      PROCEDURE PERFORMED:  Spontaneous vaginal delivery.      DETAILS:  This patient is a 30-year-old  2, now para 2, who presented to Labor and Delivery at 39 weeks' gestation with spontaneous active labor.  She had amniotomy, received an epidural, had normal progress through the first and second stage of labor, pushing effectively and delivering in a controlled fashion with a single push.  The nares and oropharynx were suctioned.  The body delivered without difficulty.  Delayed cord clamping was deployed and the cord was clamped and cut.  This vigorous male infant was placed on the mother's abdomen and was assigned Apgars of 9 and 9.  The placenta delivered assisted intact.  The vagina and cervix revealed no lacerations.  The uterus involuted well.  Total blood loss was 50 mL.  Both mother and baby are recovering well together upon my departure.  Sponge and needle counts were correct.         REGINO SALGADO MD             D: 2021   T: 2021   MT: SAROJ      Name:     JASWANT THOMAS   MRN:      [id number]        Account:        [id number]   :      1990        Delivery Date:  2021               Document: [id number]

## 2021-02-02 NOTE — PLAN OF CARE
"0955 Multip in active labor vry uncomfortable needed assist from wheelchair into bed, SVE 4-5/80/-2, patient very uncomfortable, difficult to relax in between contractions, BP elevated, Dr Fulton notified  1010 IV placed, labs obtained, IV fluids being administered for epidural placement. Patient thrashing around in bed unable to console, feels \"pushy\"  1022 Dr Leavitt at bedside, bulging bag of water, AROM fluid clear, bloody show  1030 Dr Dhaliwal at bedside for epidural placement,   1035 IV med given with relief patient able to position for epidural placement  "

## 2021-02-02 NOTE — H&P
The patient's history and physical have been reviewed, and the patient was examined. There has been no interval change in condition.    Orlando Leavitt MD

## 2021-02-03 VITALS
DIASTOLIC BLOOD PRESSURE: 72 MMHG | HEIGHT: 61 IN | RESPIRATION RATE: 16 BRPM | BODY MASS INDEX: 37.57 KG/M2 | HEART RATE: 73 BPM | TEMPERATURE: 98.4 F | WEIGHT: 199 LBS | SYSTOLIC BLOOD PRESSURE: 108 MMHG

## 2021-02-03 PROCEDURE — 250N000013 HC RX MED GY IP 250 OP 250 PS 637: Performed by: OBSTETRICS & GYNECOLOGY

## 2021-02-03 RX ADMIN — IBUPROFEN 800 MG: 800 TABLET ORAL at 15:41

## 2021-02-03 RX ADMIN — IBUPROFEN 800 MG: 800 TABLET ORAL at 03:03

## 2021-02-03 RX ADMIN — IBUPROFEN 800 MG: 800 TABLET ORAL at 09:27

## 2021-02-03 RX ADMIN — DOCUSATE SODIUM 50 MG AND SENNOSIDES 8.6 MG 1 TABLET: 8.6; 5 TABLET, FILM COATED ORAL at 09:27

## 2021-02-03 NOTE — PLAN OF CARE
Doing well with self and infant cares, breast feeding with minimal staff assist. Up to bathroom with SBA, voided without difficulty. Ibuprofen for pain with stated relief. FOB present and supportive, participating in baby cares.

## 2021-02-03 NOTE — CONSULTS
[facility]  MATERNAL CHILD HEALTH    PSYCHOSOCIAL ASSESSMENT     DATA:     Reason for Social Work Consult: EPDS 15    Presenting Information: MORGAN met with Andressa who is  to Hua, they reside in [place] with their son Cherise Malik and no  daughter Inés. They are prepared for her at home and are not on WIC.      Social Support: the couple have good friends and Hua's family nearby who are all supportive.    Employment:the couple both work at [employer] and Andressa has 3 months off work and Hua has 2 weeks off. They both have ample vacation time also.     Insurance: Commercial.    Mental Health History: Andressa has history of depression and scored 15 on EPDS with no thoughts of harm. She reports she had 2 losses one after her son was born and an ectopic pregnancy right before she became pregnant with this pregnancy. She describes it as anxiety, crying and worrying that keeps her awake at night . She denies panic attacks. She has a EAP therapist from work she will access if needed. She reports that her Zoloft helps a lot as well as speaking to Hua about how she feels.    History of Postpartum Mood Disorders: No    Chemical Health History: None      INTERVENTION:       MORGAN completed chart review and collaborated with the multidisciplinary team.     Completed Psychosocial Assessment.     Introduction to Maternal Child Health  role and scope of practice     Provided MORGAN business card.     Reviewed and provided Community Resources.     Assessed Chemical Health History and Current Symptoms.     Assessed Mental Health History and Current Symptoms.     Identified stressors, barriers and family concerns.     Provided education on  mood and anxiety disorders and provided written education/resources on this.    Provided Supportive listening.     ASSESSMENT:     Coping: Well    Affect: bright affect with frequent smiles and appropriate eye contact.    Mood: Stable and  bright.    Motivation/Ability to Access Services: Independent in accessing services.    Assessment of Support System: Stable and involved.    Level of engagement with SW: They appeared open to and appreciative of ongoing therapeutic support, advocacy, and connection with resources.     Patient's understanding of  Mental Health signs, symptoms: appropriate understanding.    Family parent/infant interactions: Couple speak very attentively of their .    Identified Strengths:Independent    Assessment of parental risk for Postpartum depression: Medium risk however she is well established with therapy and medication. She has good insight into her mental health needs and will seek healthcare as needed.  She and her sister and mother (who reside in [place]) speak via phone daily.     Identified Barriers: none    PLAN:     SW will continue to follow throughout patient s Maternal-Child Health Journey as needs arise. SW will continue to collaborate with the multidisciplinary team.    Gus PATEL Case Management  Inpatient   Maternal Child and ED  [facility]    [phone]

## 2021-02-03 NOTE — PROVIDER NOTIFICATION
02/03/21 1302   Provider Notification   Provider Name/Title Dr. LAKE Vickers   Method of Notification Phone   Request Evaluate-Remote   Notification Reason Other   Md updated on PPD: 15, no further interventions needed as SW is planning to see patient prior to discharge. Md will ensure clinic nurse to follow-up with patient in a few days for a mood check. Assigned nurse updated.     Janeth Jacobo, RN

## 2021-02-03 NOTE — PLAN OF CARE
Pt. vitals stable. Pain managed well with ibuprofen, complaining of mild cramping. Independent in infant and personal cares. Breastfeeding infant. Attentive to infant needs and bonding well with infant.

## 2021-02-03 NOTE — LACTATION NOTE
This note was copied from a baby's chart.  LC visit.  Infant has been nursing well and often, but Andressa required some assistance with positioning.  Once latched, occasional swallows heard and overall she felt confident with the tips for latching that were reviewed.  Lc also reviewed feeding on demand, unrestricted, and awakening infant for feeds until she has returned to birthweight. All questions were answered, pumping was also discussed and best practices for first bottle introduction.

## 2021-02-03 NOTE — PROVIDER NOTIFICATION
02/03/21 1258   Provider Notification   Provider Name/Title Dr. LAKE Vickers   Method of Notification Phone   Request Evaluate-Remote   Notification Reason Other   PPD: 15, Md paged, awaiting response.     Janeth Jacobo, RN

## 2021-02-03 NOTE — PLAN OF CARE
Discharge education completed, talked about follow up with OB Dr in 2 and 8 weeks, answered questions, eligible for home care, faxed and explained, discharging in stable condition with infant.

## 2021-02-04 NOTE — ANESTHESIA POSTPROCEDURE EVALUATION
Patient: Andressa Whitt    * No procedures listed *    Diagnosis:* No pre-op diagnosis entered *  Diagnosis Additional Information: No value filed.    Anesthesia Type:  Epidural    Note:  Disposition: Inpatient   Postop Pain Control:    PONV:    Neuro/Psych:    Airway/Respiratory:    CV/Hemodynamics:    Other NRE:    DID A NON-ROUTINE EVENT OCCUR?     Event details/Postop Comments:  Labor epidural analgesia satisfactory.  L&D RN removed epidural catheterNo residual sensorimotor blockade.  Patient denies headache, fever or chills.  She will notify postpartum RN of any problems or questions.         Last vitals:  Vitals:    02/02/21 1640 02/03/21 0017 02/03/21 1027   BP: 101/56 116/76 108/72   Pulse:  73    Resp:  18 16   Temp:  98.3  F (36.8  C) 98.4  F (36.9  C)       Last vitals prior to Anesthesia Care Transfer:      Electronically Signed By: Howard Smith MD  February 4, 2021  3:33 PM

## 2021-06-22 NOTE — TELEPHONE ENCOUNTER
RN Triage:  Non [facility]/No PCP.    Spoke with 28 yr old Andressa who c/o worsening tooth pain x 1 month.  Reports symptoms started off as occasional pain.    Pain is worse today.    Patient reports pain is in the R upper molar, 3rd from the back.    Pt rates pain a 9 on a 0-10 pain scale.      Dental appointment is not until 1/15/19.    Feels like some of the tooth is missing when she touches the tooth with her tongue.    Reports R side of her cheeks look swollen.    Tooth pain has worsened to the point that patient is unable to sleep or work.    Ibuprofen not helpful.    PLAN:  Advised OV evaluation now per protocol.  Numbers for ER Dental clinics provided and also urgent care locations near [place], [place].  Pt voiced understanding.    Gricelda Jerome RN   [facility] RN Triage    Reason for Disposition    Face is swollen    Protocols used: TOOTHACHE-A-[place]

## 2021-12-11 ENCOUNTER — HOSPITAL ENCOUNTER (EMERGENCY)
Facility: CLINIC | Age: 31
Discharge: HOME | End: 2021-12-11
Attending: EMERGENCY MEDICINE | Admitting: EMERGENCY MEDICINE
Payer: OTHER GOVERNMENT

## 2021-12-11 ENCOUNTER — APPOINTMENT (OUTPATIENT)
Dept: ULTRASOUND IMAGING | Facility: CLINIC | Age: 31
End: 2021-12-11
Attending: EMERGENCY MEDICINE
Payer: OTHER GOVERNMENT

## 2021-12-11 ENCOUNTER — APPOINTMENT (OUTPATIENT)
Dept: GENERAL RADIOLOGY | Facility: CLINIC | Age: 31
End: 2021-12-11
Attending: EMERGENCY MEDICINE
Payer: OTHER GOVERNMENT

## 2021-12-11 VITALS
TEMPERATURE: 98.9 F | RESPIRATION RATE: 20 BRPM | HEART RATE: 98 BPM | DIASTOLIC BLOOD PRESSURE: 38 MMHG | OXYGEN SATURATION: 100 % | SYSTOLIC BLOOD PRESSURE: 120 MMHG

## 2021-12-11 DIAGNOSIS — O21.9 NAUSEA AND VOMITING IN PREGNANCY: ICD-10-CM

## 2021-12-11 DIAGNOSIS — J10.1 INFLUENZA A: ICD-10-CM

## 2021-12-11 DIAGNOSIS — E87.6 HYPOKALEMIA: ICD-10-CM

## 2021-12-11 LAB
ANION GAP SERPL CALCULATED.3IONS-SCNC: 11 MMOL/L (ref 3–14)
B-HCG SERPL-ACNC: ABNORMAL IU/L (ref 0–5)
BASOPHILS # BLD AUTO: 0 10E3/UL (ref 0–0.2)
BASOPHILS NFR BLD AUTO: 0 %
BUN SERPL-MCNC: 8 MG/DL (ref 7–30)
CALCIUM SERPL-MCNC: 8.5 MG/DL (ref 8.5–10.1)
CHLORIDE BLD-SCNC: 103 MMOL/L (ref 94–109)
CO2 SERPL-SCNC: 22 MMOL/L (ref 20–32)
CREAT SERPL-MCNC: 0.57 MG/DL (ref 0.52–1.04)
EOSINOPHIL # BLD AUTO: 0 10E3/UL (ref 0–0.7)
EOSINOPHIL NFR BLD AUTO: 0 %
ERYTHROCYTE [DISTWIDTH] IN BLOOD BY AUTOMATED COUNT: 12.5 % (ref 10–15)
FLUAV RNA SPEC QL NAA+PROBE: POSITIVE
FLUBV RNA RESP QL NAA+PROBE: NEGATIVE
GFR SERPL CREATININE-BSD FRML MDRD: >90 ML/MIN/1.73M2
GLUCOSE BLD-MCNC: 100 MG/DL (ref 70–99)
HCT VFR BLD AUTO: 36.5 % (ref 35–47)
HGB BLD-MCNC: 12.6 G/DL (ref 11.7–15.7)
HOLD SPECIMEN: NORMAL
IMM GRANULOCYTES # BLD: 0 10E3/UL
IMM GRANULOCYTES NFR BLD: 0 %
LYMPHOCYTES # BLD AUTO: 0.9 10E3/UL (ref 0.8–5.3)
LYMPHOCYTES NFR BLD AUTO: 10 %
MCH RBC QN AUTO: 30.1 PG (ref 26.5–33)
MCHC RBC AUTO-ENTMCNC: 34.5 G/DL (ref 31.5–36.5)
MCV RBC AUTO: 87 FL (ref 78–100)
MONOCYTES # BLD AUTO: 0.7 10E3/UL (ref 0–1.3)
MONOCYTES NFR BLD AUTO: 8 %
NEUTROPHILS # BLD AUTO: 6.9 10E3/UL (ref 1.6–8.3)
NEUTROPHILS NFR BLD AUTO: 82 %
NRBC # BLD AUTO: 0 10E3/UL
NRBC BLD AUTO-RTO: 0 /100
PLATELET # BLD AUTO: 224 10E3/UL (ref 150–450)
POTASSIUM BLD-SCNC: 3.1 MMOL/L (ref 3.4–5.3)
RBC # BLD AUTO: 4.18 10E6/UL (ref 3.8–5.2)
SARS-COV-2 RNA RESP QL NAA+PROBE: NEGATIVE
SODIUM SERPL-SCNC: 136 MMOL/L (ref 133–144)
WBC # BLD AUTO: 8.5 10E3/UL (ref 4–11)

## 2021-12-11 PROCEDURE — 85025 COMPLETE CBC W/AUTO DIFF WBC: CPT | Performed by: EMERGENCY MEDICINE

## 2021-12-11 PROCEDURE — C9803 HOPD COVID-19 SPEC COLLECT: HCPCS

## 2021-12-11 PROCEDURE — 99285 EMERGENCY DEPT VISIT HI MDM: CPT | Mod: 25

## 2021-12-11 PROCEDURE — 76801 OB US < 14 WKS SINGLE FETUS: CPT

## 2021-12-11 PROCEDURE — 84702 CHORIONIC GONADOTROPIN TEST: CPT | Performed by: EMERGENCY MEDICINE

## 2021-12-11 PROCEDURE — 71045 X-RAY EXAM CHEST 1 VIEW: CPT

## 2021-12-11 PROCEDURE — 258N000003 HC RX IP 258 OP 636: Performed by: EMERGENCY MEDICINE

## 2021-12-11 PROCEDURE — 87636 SARSCOV2 & INF A&B AMP PRB: CPT | Performed by: EMERGENCY MEDICINE

## 2021-12-11 PROCEDURE — 36415 COLL VENOUS BLD VENIPUNCTURE: CPT | Performed by: EMERGENCY MEDICINE

## 2021-12-11 PROCEDURE — 82310 ASSAY OF CALCIUM: CPT | Performed by: EMERGENCY MEDICINE

## 2021-12-11 PROCEDURE — 96374 THER/PROPH/DIAG INJ IV PUSH: CPT

## 2021-12-11 PROCEDURE — 96361 HYDRATE IV INFUSION ADD-ON: CPT

## 2021-12-11 PROCEDURE — 250N000011 HC RX IP 250 OP 636: Performed by: EMERGENCY MEDICINE

## 2021-12-11 RX ORDER — METOCLOPRAMIDE HYDROCHLORIDE 5 MG/ML
10 INJECTION INTRAMUSCULAR; INTRAVENOUS ONCE
Status: COMPLETED | OUTPATIENT
Start: 2021-12-11 | End: 2021-12-11

## 2021-12-11 RX ORDER — OSELTAMIVIR PHOSPHATE 75 MG/1
75 CAPSULE ORAL 2 TIMES DAILY
Qty: 10 CAPSULE | Refills: 0 | Status: SHIPPED | OUTPATIENT
Start: 2021-12-11

## 2021-12-11 RX ORDER — POTASSIUM CHLORIDE 1500 MG/1
20 TABLET, EXTENDED RELEASE ORAL DAILY
Qty: 5 TABLET | Refills: 0 | Status: SHIPPED | OUTPATIENT
Start: 2021-12-11 | End: 2021-12-16

## 2021-12-11 RX ORDER — SODIUM CHLORIDE 9 MG/ML
INJECTION, SOLUTION INTRAVENOUS CONTINUOUS
Status: DISCONTINUED | OUTPATIENT
Start: 2021-12-11 | End: 2021-12-12 | Stop reason: HOSPADM

## 2021-12-11 RX ORDER — OSELTAMIVIR PHOSPHATE 75 MG/1
75 CAPSULE ORAL 2 TIMES DAILY
Qty: 10 CAPSULE | Refills: 0 | Status: SHIPPED | OUTPATIENT
Start: 2021-12-11 | End: 2021-12-11

## 2021-12-11 RX ORDER — METOCLOPRAMIDE 10 MG/1
10 TABLET ORAL 4 TIMES DAILY PRN
Qty: 12 TABLET | Refills: 1 | Status: SHIPPED | OUTPATIENT
Start: 2021-12-11

## 2021-12-11 RX ADMIN — METOCLOPRAMIDE HYDROCHLORIDE 10 MG: 5 INJECTION INTRAMUSCULAR; INTRAVENOUS at 21:07

## 2021-12-11 RX ADMIN — SODIUM CHLORIDE 1000 ML: 9 INJECTION, SOLUTION INTRAVENOUS at 21:07

## 2021-12-11 ASSESSMENT — ENCOUNTER SYMPTOMS
FEVER: 1
HEADACHES: 1
SORE THROAT: 1
NAUSEA: 1
COUGH: 1
ABDOMINAL PAIN: 0
VOMITING: 1

## 2021-12-12 NOTE — ED TRIAGE NOTES
Pt states pharyngitis, vomiting, headache and cough for over 2 days. Pt states unsure if exposed to COVID. Pt states has not been vaccinated for COVID. Pt states currently approximately 10 weeks gestation, has not seen OB for pregnancy yet. ABCs intact GCS 15

## 2021-12-12 NOTE — ED PROVIDER NOTES
History   Chief Complaint:  Suspected Covid       HPI   Andressa Whitt is a 31 year old female with history of ectopic pregnancy who presents with three days of nausea, vomiting, headache, sore throat, cough, and fever. No neck stiffness. She denies vaginal bleeding or abdominal cramping. Her LMP was two months ago and she had a positive at-home pregnancy test. She denies a history of Covid and is not Covid vaccinated.    Review of Systems   Constitutional: Positive for fever.   HENT: Positive for sore throat.    Respiratory: Positive for cough.    Gastrointestinal: Positive for nausea and vomiting. Negative for abdominal pain.   Genitourinary: Negative for vaginal bleeding.   Neurological: Positive for headaches.   All other systems reviewed and are negative.    Allergies:  The patient has no known allergies.     Medications:  Sertraline    Past Medical History:     Depression  Asthma  Ectopic pregnancy       Past Surgical History:    Appendectomy  Ectopic pregnancy evacuation, right  orthopedic surgery      Social History:  Patient presents alone.    Physical Exam     Patient Vitals for the past 24 hrs:   BP Temp Temp src Pulse Resp SpO2   12/11/21 2145 (!) 120/38 -- -- 98 20 100 %   12/11/21 2130 -- -- -- -- 19 --   12/11/21 2100 -- -- -- 99 16 --   12/11/21 2011 109/69 98.9  F (37.2  C) Oral 100 20 99 %       Physical Exam  VS: Reviewed per above  HENT: Mucous membranes moist, no nuchal rigidity  EYES: sclera anicteric  CV: Rate as noted, regular rhythm.   RESP: Effort normal. Breath sounds are normal bilaterally.  GI: no tenderness/rebound/guarding, not distended.  NEURO: GCS 15, cranial nerves II through XII are intact, 5 out of 5 strength in all 4 extremities, sensation is intact light touch in all 4 extremities  MSK: No deformity of the extremities  SKIN: Warm and dry    Emergency Department Course     Imaging:  XR Chest Port 1 View   Final Result   IMPRESSION: Cardiomediastinal silhouette is within  normal limits. No focal consolidation or pleural effusion.      US OB < 14 Weeks Single   Final Result   IMPRESSION:    1.  Single living intrauterine gestation at 10 weeks 2 days, EDC 07/07/2022.              Report per radiology    Laboratory:  Labs Ordered and Resulted from Time of ED Arrival to Time of ED Departure   INFLUENZA A/B & SARS-COV2 PCR MULTIPLEX - Abnormal       Result Value    Influenza A PCR Positive (*)     Influenza B PCR Negative      SARS CoV2 PCR Negative     BASIC METABOLIC PANEL - Abnormal    Sodium 136      Potassium 3.1 (*)     Chloride 103      Carbon Dioxide (CO2) 22      Anion Gap 11      Urea Nitrogen 8      Creatinine 0.57      Calcium 8.5      Glucose 100 (*)     GFR Estimate >90     HCG QUANTITATIVE PREGNANCY - Abnormal    hCG Quantitative 92,659 (*)    CBC WITH PLATELETS AND DIFFERENTIAL    WBC Count 8.5      RBC Count 4.18      Hemoglobin 12.6      Hematocrit 36.5      MCV 87      MCH 30.1      MCHC 34.5      RDW 12.5      Platelet Count 224      % Neutrophils 82      % Lymphocytes 10      % Monocytes 8      % Eosinophils 0      % Basophils 0      % Immature Granulocytes 0      NRBCs per 100 WBC 0      Absolute Neutrophils 6.9      Absolute Lymphocytes 0.9      Absolute Monocytes 0.7      Absolute Eosinophils 0.0      Absolute Basophils 0.0      Absolute Immature Granulocytes 0.0      Absolute NRBCs 0.0          Emergency Department Course:    Reviewed:  I reviewed nursing notes, vitals, past medical history and Care Everywhere    Assessments:  2045 I obtained history and examined the patient as noted above.   2215 I rechecked the patient and explained findings.   2228 I rechecked and updated the patient.    Interventions:  2107 NS 1L IV Bolus  2107 Reglan 10 mg IV    Disposition:  The patient was discharged to home.     Impression & Plan     CMS Diagnoses: None    Medical Decision Making:  Patient presents to the ER for evaluation of nausea and vomiting, fever, cough, sore throat,  headache.  On arrival vital signs are reassuring.  Exam is unremarkable.  She does not have clinical signs of meningitis.  Chest x-ray does not show pneumonia.  Covid testing is negative but influenza A testing is positive.  Patient is pregnant and had ultrasound today confirming IUP at 10 weeks and 2 days.  For this reason she was given Tamiflu.  After IV fluids and Reglan, she tolerated p.o. and her nausea had abated.  She will be discharged with Reglan to use as needed for nausea and vomiting in pregnancy.  She was also discharged with potassium replacement due to mild hypokalemia.  Encouraged close OB/GYN follow.  Return precautions discussed prior to discharge.    Covid-19  Andressa Whitt was evaluated during a global COVID-19 pandemic, which necessitated consideration that the patient might be at risk for infection with the SARS-CoV-2 virus that causes COVID-19.   Applicable protocols for evaluation were followed during the patient's care.   COVID-19 was considered as part of the patient's evaluation. The plan for testing is:  a test was obtained during this visit.    Diagnosis:    ICD-10-CM    1. Hypokalemia  E87.6    2. Influenza A  J10.1    3. Nausea and vomiting in pregnancy  O21.9        Discharge Medications:  Discharge Medication List as of 12/11/2021 10:55 PM      START taking these medications    Details   metoclopramide (REGLAN) 10 MG tablet Take 1 tablet (10 mg) by mouth 4 times daily as needed (nausea and vomiting), Disp-12 tablet, R-1, E-Prescribe      potassium chloride ER (K-TAB) 20 MEQ CR tablet Take 1 tablet (20 mEq) by mouth daily for 5 days, Disp-5 tablet, R-0, E-Prescribe             Scribe Disclosure:  Vinay GARCIA, am serving as a scribe at 8:42 PM on 12/11/2021 to document services personally performed by Shawn Rincon MD based on my observations and the provider's statements to me.            Shawn Rincon MD  12/11/21 [id number]

## 2021-12-12 NOTE — DISCHARGE INSTRUCTIONS
Discharge Instructions  Influenza    You were diagnosed today with influenza or influenza like illness.  Influenza is a respiratory (breathing) illness caused by influenza A or B viruses.  Influenza causes five primary symptoms: fever, headache, muscle aches/fatigue/malaise, sore throat and cough.  These symptoms start one to four days after you have been around a person with this illness. Influenza is spread through sneezing and coughing and can live on surfaces for several days.  It is usually contagious for 5 days but in some cases up to 10 days and often affects several family members. If you have a family member who is less than 2 years old, older than 65 years old, pregnant or has a serious medical condition, they should be seen right away by a provider to decide if they should take preventative medications. Although influenza will make you feel very ill, most patients don t require any specific treatment. An antiviral medication might be prescribed for certain groups of patients (older patients, younger patients, and those with certain chronic medical problems).    Generally, every Emergency Department visit should have a follow-up clinic visit with either a primary or a specialty clinic/provider. Please follow-up as instructed by your emergency provider today.    Return to the Emergency Department if:  You have trouble breathing.  You develop pain in your chest.  You have signs of being dehydrated, such as dizziness or unable to urinate (pee) at least three times daily.  You are confused or severely weak.  You cannot stop vomiting (throwing up) or you cannot drink enough fluids.    In children, you should seek help if the child has any of the above or if child:  Has blue or purplish skin color.  Is so irritable that he or she does not want to be held.  Does not have tears when crying (in infants) or does not urinate at least three times daily.  Does not wake up easily.    What can I do to help  myself?  Rest.  Fluids -- Drink hydrating solutions such as Gatorade  or Pedialyte  as often as you can. If you are drinking enough, you should pass urine at least every eight hours.  Tylenol  (acetaminophen) and Advil  (ibuprofen) can relieve fever, headache, and muscle aches. Do not give aspirin to children under 18 years old.   Antiviral treatment -- Antiviral medicines do not make the flu symptoms go away immediately.  They have only been shown to make the symptoms go away 12 to 24 hours sooner than they would without treatment.     Antibiotics -- Antibiotics are NOT useful for treating viral illnesses such as influenza. Antibiotics should only be used if there is a bacterial complication of the flu such as bacterial pneumonia, ear infection, or sinusitis.  Because you were diagnosed with a flu like illness you are very contagious.  This means you cannot work, attend school or  for at least 24 hours or until you no longer have a fever.  If you were given a prescription for medicine here today, be sure to read all of the information (including the package insert) that comes with your prescription.  This will include important information about the medicine, its side effects, and any warnings that you need to know about.  The pharmacist who fills the prescription can provide more information and answer questions you may have about the medicine.  If you have questions or concerns that the pharmacist cannot address, please call or return to the Emergency Department.   Remember that you can always come back to the Emergency Department if you are not able to see your regular provider in the amount of time listed above, if you get any new symptoms, or if there is anything that worries you.    Discharge Instructions  Hyperemesis Gravidarum    You were seen today for hyperemesis gravidarum. It is very common to have some nausea (sick to your stomach) and vomiting (throwing up) in early pregnancy (sometimes called   morning sickness,  although it happens at all times of day.) In hyperemesis gravidarum, however, the vomiting is much more severe, so you may become dehydrated and lose weight. We have treated you today to manage your symptoms and rehydrate you. Often these symptoms persist during the first trimester of pregnancy before improving so you will likely need ongoing care.    Generally, every Emergency Department visit should have a follow-up clinic visit with either a primary or a specialty clinic/provider. Please follow-up as instructed by your emergency provider today.    Return to the Emergency Department if:  You feel dizzy or light headed when standing up.  You are vomiting and cannot keep fluids or medications down.  You urinate (pee) much less than normal.  You feel much more ill or develop new symptoms.    What can I do to help myself?  Be sure to drink plenty of cold clear fluids and suck on popsicles between meals. Pedialyte is a good rehydration liquid but many people don t like the taste so sport drinks are a good alternative (Gatorade). Soda is often excessively sweat but Ginger Ale is sometimes recommended for this condition.  Eat as soon as you feel hungry, or even before you feel hungry. Try to keep something on your stomach.   Avoid strong smells, or other things that make you feel nauseated.  Snack often and eat small meals high in protein or carbohydrates such as crackers, bread, nuts, and low-fat yogurt. Avoid spicy, greasy, or acid foods.  Avoid lying down right after you eat.  Take your prenatal vitamins at bedtime with a snack instead of taking them in the morning.  Ginger may make you feel better. Try eating a small piece of ginger, or having ginger-flavored hard candies.  Acupressure wrist bands are also helpful to some people.    Treatment:  Nausea medication. Your provider may send you home with a prescription medicine, like Compazine  (prochlorperazine) or Reglan  (metoclopramide). Zofran   (ondansetron) is sometimes used as well.  Your provider may recommend that you take non-prescription nausea medicines, like Dramamine  (dimenhydrinate), Unisom  (doxylamine), or Vitamin B6 (pyridoxine).  Vitamins. Make sure your prenatal vitamins have 400 micrograms of Folic acid and have vitamin B6, since this may help your nausea and vomiting.   If you were given a prescription for medicine here today, be sure to read all of the information (including the package insert) that comes with your prescription.  This will include important information about the medicine, its side effects, and any warnings that you need to know about.  The pharmacist who fills the prescription can provide more information and answer questions you may have about the medicine.  If you have questions or concerns that the pharmacist cannot address, please call or return to the Emergency Department.    Remember that you can always come back to the Emergency Department if you are not able to see your regular provider in the amount of time listed above, if you get any new symptoms, or if there is anything that worries you.

## 2022-07-08 ENCOUNTER — ANESTHESIA EVENT (OUTPATIENT)
Dept: OBGYN | Facility: CLINIC | Age: 32
End: 2022-07-08
Payer: COMMERCIAL

## 2022-07-08 ENCOUNTER — ANESTHESIA (OUTPATIENT)
Dept: OBGYN | Facility: CLINIC | Age: 32
End: 2022-07-08
Payer: COMMERCIAL

## 2022-07-08 ENCOUNTER — HOSPITAL ENCOUNTER (INPATIENT)
Facility: CLINIC | Age: 32
LOS: 1 days | Discharge: HOME | End: 2022-07-09
Attending: OBSTETRICS & GYNECOLOGY | Admitting: OBSTETRICS & GYNECOLOGY
Payer: COMMERCIAL

## 2022-07-08 LAB
ABO/RH(D): NORMAL
ANTIBODY SCREEN: NEGATIVE
SARS-COV-2 RNA RESP QL NAA+PROBE: NEGATIVE
SPECIMEN EXPIRATION DATE: NORMAL

## 2022-07-08 PROCEDURE — 86850 RBC ANTIBODY SCREEN: CPT | Performed by: OBSTETRICS & GYNECOLOGY

## 2022-07-08 PROCEDURE — 250N000009 HC RX 250: Performed by: OBSTETRICS & GYNECOLOGY

## 2022-07-08 PROCEDURE — 3E0R3BZ INTRODUCTION OF ANESTHETIC AGENT INTO SPINAL CANAL, PERCUTANEOUS APPROACH: ICD-10-PCS | Performed by: ANESTHESIOLOGY

## 2022-07-08 PROCEDURE — 722N000001 HC LABOR CARE VAGINAL DELIVERY SINGLE

## 2022-07-08 PROCEDURE — 10907ZC DRAINAGE OF AMNIOTIC FLUID, THERAPEUTIC FROM PRODUCTS OF CONCEPTION, VIA NATURAL OR ARTIFICIAL OPENING: ICD-10-PCS | Performed by: OBSTETRICS & GYNECOLOGY

## 2022-07-08 PROCEDURE — 120N000001 HC R&B MED SURG/OB

## 2022-07-08 PROCEDURE — 250N000011 HC RX IP 250 OP 636

## 2022-07-08 PROCEDURE — 86780 TREPONEMA PALLIDUM: CPT | Performed by: OBSTETRICS & GYNECOLOGY

## 2022-07-08 PROCEDURE — U0005 INFEC AGEN DETEC AMPLI PROBE: HCPCS | Performed by: OBSTETRICS & GYNECOLOGY

## 2022-07-08 PROCEDURE — 250N000013 HC RX MED GY IP 250 OP 250 PS 637: Performed by: OBSTETRICS & GYNECOLOGY

## 2022-07-08 PROCEDURE — 36415 COLL VENOUS BLD VENIPUNCTURE: CPT | Performed by: OBSTETRICS & GYNECOLOGY

## 2022-07-08 PROCEDURE — 370N000003 HC ANESTHESIA WARD SERVICE

## 2022-07-08 PROCEDURE — 00HU33Z INSERTION OF INFUSION DEVICE INTO SPINAL CANAL, PERCUTANEOUS APPROACH: ICD-10-PCS | Performed by: ANESTHESIOLOGY

## 2022-07-08 PROCEDURE — 258N000003 HC RX IP 258 OP 636: Performed by: OBSTETRICS & GYNECOLOGY

## 2022-07-08 PROCEDURE — 250N000011 HC RX IP 250 OP 636: Performed by: ANESTHESIOLOGY

## 2022-07-08 RX ORDER — NALOXONE HYDROCHLORIDE 0.4 MG/ML
0.4 INJECTION, SOLUTION INTRAMUSCULAR; INTRAVENOUS; SUBCUTANEOUS
Status: DISCONTINUED | OUTPATIENT
Start: 2022-07-08 | End: 2022-07-09 | Stop reason: HOSPADM

## 2022-07-08 RX ORDER — TRANEXAMIC ACID 10 MG/ML
1 INJECTION, SOLUTION INTRAVENOUS EVERY 30 MIN PRN
Status: DISCONTINUED | OUTPATIENT
Start: 2022-07-08 | End: 2022-07-08

## 2022-07-08 RX ORDER — NALOXONE HYDROCHLORIDE 0.4 MG/ML
0.2 INJECTION, SOLUTION INTRAMUSCULAR; INTRAVENOUS; SUBCUTANEOUS
Status: DISCONTINUED | OUTPATIENT
Start: 2022-07-08 | End: 2022-07-09 | Stop reason: HOSPADM

## 2022-07-08 RX ORDER — MODIFIED LANOLIN
OINTMENT (GRAM) TOPICAL
Status: DISCONTINUED | OUTPATIENT
Start: 2022-07-08 | End: 2022-07-09 | Stop reason: HOSPADM

## 2022-07-08 RX ORDER — TRANEXAMIC ACID 10 MG/ML
1 INJECTION, SOLUTION INTRAVENOUS EVERY 30 MIN PRN
Status: DISCONTINUED | OUTPATIENT
Start: 2022-07-08 | End: 2022-07-09 | Stop reason: HOSPADM

## 2022-07-08 RX ORDER — IBUPROFEN 800 MG/1
800 TABLET, FILM COATED ORAL EVERY 6 HOURS PRN
Status: DISCONTINUED | OUTPATIENT
Start: 2022-07-08 | End: 2022-07-09 | Stop reason: HOSPADM

## 2022-07-08 RX ORDER — METOCLOPRAMIDE 10 MG/1
10 TABLET ORAL EVERY 6 HOURS PRN
Status: DISCONTINUED | OUTPATIENT
Start: 2022-07-08 | End: 2022-07-08

## 2022-07-08 RX ORDER — CARBOPROST TROMETHAMINE 250 UG/ML
250 INJECTION, SOLUTION INTRAMUSCULAR
Status: DISCONTINUED | OUTPATIENT
Start: 2022-07-08 | End: 2022-07-09 | Stop reason: HOSPADM

## 2022-07-08 RX ORDER — OXYCODONE HYDROCHLORIDE 5 MG/1
5 TABLET ORAL EVERY 4 HOURS PRN
Status: DISCONTINUED | OUTPATIENT
Start: 2022-07-08 | End: 2022-07-09 | Stop reason: HOSPADM

## 2022-07-08 RX ORDER — FENTANYL/BUPIVACAINE/NS/PF 2-1250MCG
PLASTIC BAG, INJECTION (ML) INJECTION
Status: COMPLETED
Start: 2022-07-08 | End: 2022-07-08

## 2022-07-08 RX ORDER — OXYTOCIN/0.9 % SODIUM CHLORIDE 30/500 ML
100-340 PLASTIC BAG, INJECTION (ML) INTRAVENOUS CONTINUOUS PRN
Status: DISCONTINUED | OUTPATIENT
Start: 2022-07-08 | End: 2022-07-08

## 2022-07-08 RX ORDER — OXYTOCIN 10 [USP'U]/ML
10 INJECTION, SOLUTION INTRAMUSCULAR; INTRAVENOUS
Status: DISCONTINUED | OUTPATIENT
Start: 2022-07-08 | End: 2022-07-08

## 2022-07-08 RX ORDER — DOCUSATE SODIUM 100 MG/1
100 CAPSULE, LIQUID FILLED ORAL DAILY
Status: DISCONTINUED | OUTPATIENT
Start: 2022-07-08 | End: 2022-07-09 | Stop reason: HOSPADM

## 2022-07-08 RX ORDER — HYDROCORTISONE 25 MG/G
CREAM TOPICAL 3 TIMES DAILY PRN
Status: DISCONTINUED | OUTPATIENT
Start: 2022-07-08 | End: 2022-07-09 | Stop reason: HOSPADM

## 2022-07-08 RX ORDER — METOCLOPRAMIDE HYDROCHLORIDE 5 MG/ML
10 INJECTION INTRAMUSCULAR; INTRAVENOUS EVERY 6 HOURS PRN
Status: DISCONTINUED | OUTPATIENT
Start: 2022-07-08 | End: 2022-07-08

## 2022-07-08 RX ORDER — NALOXONE HYDROCHLORIDE 0.4 MG/ML
0.2 INJECTION, SOLUTION INTRAMUSCULAR; INTRAVENOUS; SUBCUTANEOUS
Status: DISCONTINUED | OUTPATIENT
Start: 2022-07-08 | End: 2022-07-08

## 2022-07-08 RX ORDER — NALBUPHINE HYDROCHLORIDE 10 MG/ML
2.5-5 INJECTION, SOLUTION INTRAMUSCULAR; INTRAVENOUS; SUBCUTANEOUS EVERY 6 HOURS PRN
Status: DISCONTINUED | OUTPATIENT
Start: 2022-07-08 | End: 2022-07-08

## 2022-07-08 RX ORDER — MISOPROSTOL 200 UG/1
400 TABLET ORAL
Status: DISCONTINUED | OUTPATIENT
Start: 2022-07-08 | End: 2022-07-09 | Stop reason: HOSPADM

## 2022-07-08 RX ORDER — OXYTOCIN/0.9 % SODIUM CHLORIDE 30/500 ML
340 PLASTIC BAG, INJECTION (ML) INTRAVENOUS CONTINUOUS PRN
Status: COMPLETED | OUTPATIENT
Start: 2022-07-08 | End: 2022-07-08

## 2022-07-08 RX ORDER — METHYLERGONOVINE MALEATE 0.2 MG/ML
200 INJECTION INTRAVENOUS
Status: DISCONTINUED | OUTPATIENT
Start: 2022-07-08 | End: 2022-07-09 | Stop reason: HOSPADM

## 2022-07-08 RX ORDER — MISOPROSTOL 200 UG/1
400 TABLET ORAL
Status: DISCONTINUED | OUTPATIENT
Start: 2022-07-08 | End: 2022-07-08

## 2022-07-08 RX ORDER — ACETAMINOPHEN 325 MG/1
650 TABLET ORAL EVERY 4 HOURS PRN
Status: DISCONTINUED | OUTPATIENT
Start: 2022-07-08 | End: 2022-07-09 | Stop reason: HOSPADM

## 2022-07-08 RX ORDER — CARBOPROST TROMETHAMINE 250 UG/ML
250 INJECTION, SOLUTION INTRAMUSCULAR
Status: DISCONTINUED | OUTPATIENT
Start: 2022-07-08 | End: 2022-07-08

## 2022-07-08 RX ORDER — PRENATAL VIT/IRON FUM/FOLIC AC 27MG-0.8MG
1 TABLET ORAL DAILY
Status: DISCONTINUED | OUTPATIENT
Start: 2022-07-08 | End: 2022-07-09 | Stop reason: HOSPADM

## 2022-07-08 RX ORDER — METHYLERGONOVINE MALEATE 0.2 MG/ML
200 INJECTION INTRAVENOUS
Status: DISCONTINUED | OUTPATIENT
Start: 2022-07-08 | End: 2022-07-08

## 2022-07-08 RX ORDER — CITRIC ACID/SODIUM CITRATE 334-500MG
30 SOLUTION, ORAL ORAL
Status: DISCONTINUED | OUTPATIENT
Start: 2022-07-08 | End: 2022-07-08

## 2022-07-08 RX ORDER — BISACODYL 10 MG
10 SUPPOSITORY, RECTAL RECTAL DAILY PRN
Status: DISCONTINUED | OUTPATIENT
Start: 2022-07-08 | End: 2022-07-09 | Stop reason: HOSPADM

## 2022-07-08 RX ORDER — BUPIVACAINE HYDROCHLORIDE 2.5 MG/ML
INJECTION, SOLUTION EPIDURAL; INFILTRATION; INTRACAUDAL
Status: COMPLETED | OUTPATIENT
Start: 2022-07-08 | End: 2022-07-08

## 2022-07-08 RX ORDER — ONDANSETRON 2 MG/ML
4 INJECTION INTRAMUSCULAR; INTRAVENOUS EVERY 6 HOURS PRN
Status: DISCONTINUED | OUTPATIENT
Start: 2022-07-08 | End: 2022-07-08

## 2022-07-08 RX ORDER — MISOPROSTOL 200 UG/1
800 TABLET ORAL
Status: DISCONTINUED | OUTPATIENT
Start: 2022-07-08 | End: 2022-07-09 | Stop reason: HOSPADM

## 2022-07-08 RX ORDER — MISOPROSTOL 200 UG/1
800 TABLET ORAL
Status: DISCONTINUED | OUTPATIENT
Start: 2022-07-08 | End: 2022-07-08

## 2022-07-08 RX ORDER — OXYTOCIN 10 [USP'U]/ML
10 INJECTION, SOLUTION INTRAMUSCULAR; INTRAVENOUS
Status: DISCONTINUED | OUTPATIENT
Start: 2022-07-08 | End: 2022-07-09 | Stop reason: HOSPADM

## 2022-07-08 RX ORDER — NALOXONE HYDROCHLORIDE 0.4 MG/ML
0.4 INJECTION, SOLUTION INTRAMUSCULAR; INTRAVENOUS; SUBCUTANEOUS
Status: DISCONTINUED | OUTPATIENT
Start: 2022-07-08 | End: 2022-07-08

## 2022-07-08 RX ORDER — PROCHLORPERAZINE MALEATE 10 MG
10 TABLET ORAL EVERY 6 HOURS PRN
Status: DISCONTINUED | OUTPATIENT
Start: 2022-07-08 | End: 2022-07-08

## 2022-07-08 RX ORDER — OXYTOCIN/0.9 % SODIUM CHLORIDE 30/500 ML
340 PLASTIC BAG, INJECTION (ML) INTRAVENOUS CONTINUOUS PRN
Status: DISCONTINUED | OUTPATIENT
Start: 2022-07-08 | End: 2022-07-09 | Stop reason: HOSPADM

## 2022-07-08 RX ORDER — FENTANYL CITRATE-0.9 % NACL/PF 10 MCG/ML
100 PLASTIC BAG, INJECTION (ML) INTRAVENOUS EVERY 5 MIN PRN
Status: DISCONTINUED | OUTPATIENT
Start: 2022-07-08 | End: 2022-07-08

## 2022-07-08 RX ORDER — SERTRALINE HYDROCHLORIDE 25 MG/1
25 TABLET, FILM COATED ORAL DAILY
Status: DISCONTINUED | OUTPATIENT
Start: 2022-07-08 | End: 2022-07-09 | Stop reason: HOSPADM

## 2022-07-08 RX ORDER — ONDANSETRON 4 MG/1
4 TABLET, ORALLY DISINTEGRATING ORAL EVERY 6 HOURS PRN
Status: DISCONTINUED | OUTPATIENT
Start: 2022-07-08 | End: 2022-07-08

## 2022-07-08 RX ORDER — PROCHLORPERAZINE 25 MG
25 SUPPOSITORY, RECTAL RECTAL EVERY 12 HOURS PRN
Status: DISCONTINUED | OUTPATIENT
Start: 2022-07-08 | End: 2022-07-08

## 2022-07-08 RX ADMIN — ACETAMINOPHEN 650 MG: 325 TABLET, FILM COATED ORAL at 21:00

## 2022-07-08 RX ADMIN — Medication 340 ML/HR: at 14:43

## 2022-07-08 RX ADMIN — SODIUM CHLORIDE, POTASSIUM CHLORIDE, SODIUM LACTATE AND CALCIUM CHLORIDE 1000 ML: 600; 310; 30; 20 INJECTION, SOLUTION INTRAVENOUS at 13:27

## 2022-07-08 RX ADMIN — DOCUSATE SODIUM 100 MG: 100 CAPSULE, LIQUID FILLED ORAL at 21:27

## 2022-07-08 RX ADMIN — IBUPROFEN 800 MG: 800 TABLET, FILM COATED ORAL at 17:56

## 2022-07-08 RX ADMIN — Medication: at 13:38

## 2022-07-08 RX ADMIN — BUPIVACAINE HYDROCHLORIDE 10 ML: 2.5 INJECTION, SOLUTION EPIDURAL; INFILTRATION; INTRACAUDAL at 13:25

## 2022-07-08 ASSESSMENT — ACTIVITIES OF DAILY LIVING (ADL)
ADLS_ACUITY_SCORE: 35

## 2022-07-08 NOTE — PROVIDER NOTIFICATION
22 1308   Provider Notification   Provider Name/Title Dr. Vickers   Method of Notification Phone   Request Evaluate - Remote   Patient here in labor, 39 5/7 weeks, , wants epidural. SVE /-2. Dr. Vickers updated. Monitors explained and applied. History reviewed. 1318- Dr. Vitale here for epidural.

## 2022-07-08 NOTE — ANESTHESIA PREPROCEDURE EVALUATION
Anesthesia Pre-Procedure Evaluation    Patient: Andressa Patterson   MRN: [id number] : 1990        Procedure : * No procedures listed *          Past Medical History:   Diagnosis Date     Depressive disorder      Uncomplicated asthma       Past Surgical History:   Procedure Laterality Date     APPENDECTOMY       LAPAROSCOPIC EVACUATION ECTOPIC PREGNANCY Right 2020    Procedure: SINGLE SITE LAPAROSCOPY, RIGHT SALPINGECTOMY, LYSIS OF ADHESIONS, REMOVAL OF PARATUBAL CYST;  Surgeon: Mamadou Ratliff MD;  Location: [facility] OR     ORTHOPEDIC SURGERY        No Known Allergies   Social History     Tobacco Use     Smoking status: Never Smoker     Smokeless tobacco: Never Used   Substance Use Topics     Alcohol use: Not Currently      Wt Readings from Last 1 Encounters:   21 90.3 kg (199 lb)        Anesthesia Evaluation            ROS/MED HX  ENT/Pulmonary:     (+) asthma     Neurologic:       Cardiovascular:       METS/Exercise Tolerance:     Hematologic:       Musculoskeletal:       GI/Hepatic:       Renal/Genitourinary:       Endo:     (+) Obesity,     Psychiatric/Substance Use:       Infectious Disease:       Malignancy:       Other:            Physical Exam    Airway        Mallampati: II   TM distance: > 3 FB   Neck ROM: full     Respiratory Devices and Support         Dental           Cardiovascular   cardiovascular exam normal          Pulmonary   pulmonary exam normal                OUTSIDE LABS:  CBC:   Lab Results   Component Value Date    WBC 8.5 2021    WBC 12.4 (H) 2021    HGB 12.6 2021    HGB 13.3 2021    HCT 36.5 2021    HCT 40.1 2021     2021     2021     BMP:   Lab Results   Component Value Date     2021     2020    POTASSIUM 3.1 (L) 2021    POTASSIUM 3.8 2020    CHLORIDE 103 2021    CHLORIDE 105 2020    CO2 22 2021    CO2 28 2020    BUN 8 2021    BUN 13 2020     CR 0.57 12/11/2021    CR 0.68 02/02/2021     (H) 12/11/2021     (H) 02/14/2020     COAGS: No results found for: PTT, INR, FIBR  POC: No results found for: BGM, HCG, HCGS  HEPATIC:   Lab Results   Component Value Date    ALBUMIN 4.1 01/30/2020    PROTTOTAL 7.8 01/30/2020    ALT 17 02/02/2021    AST 20 02/02/2021    ALKPHOS 68 01/30/2020    BILITOTAL 0.5 01/30/2020     OTHER:   Lab Results   Component Value Date    [place] 8.5 12/11/2021       Anesthesia Plan    ASA Status:  2      Anesthesia Type: Epidural.              Consents    Anesthesia Plan(s) and associated risks, benefits, and realistic alternatives discussed. Questions answered and patient/representative(s) expressed understanding.    - Discussed:     - Discussed with:  Patient         Postoperative Care            Comments:                Valarie Vitale MD

## 2022-07-08 NOTE — PROVIDER NOTIFICATION
Dr LOUISE Vickers web paged to notify patient is dilated 8 cm and resting with an epidural.  Call back requested.

## 2022-07-08 NOTE — L&D DELIVERY NOTE
Delivery Date: 2022    DIAGNOSES:   1.  A 39-week gestation.  2.  Labor.    PROCEDURE:  Normal spontaneous vaginal delivery.    ANESTHESIA:  Epidural.    QUANTITATIVE BLOOD LOSS:  60 mL.    FINDINGS:  Liveborn female infant.  Weight was 7 pounds 8 ounces.  Apgars were 8 and 9 at one and five minutes respectively.  There were no lacerations.    HISTORY:  The patient is a 31-year-old  3, para 2, who presents at 39 and 5/7 weeks' gestation in active labor.  She was 6 cm on presentation.  Nonstress test was reactive upon presentation and group B strep was known to be negative.  Her prenatal course was otherwise uncomplicated.  She received an epidural for pain control.  Soon thereafter, she was found to be 8 cm.  An amniotomy was performed with clear fluid.  She progressed to an anterior lip and had an urge to push.    DESCRIPTION OF PROCEDURE:  The patient pushed effectively for 15 minutes.  The fetal vertex was initially descended in the right occiput posterior presentation, but as the patient pushed, the baby rotated to the right occiput anterior presentation.  The anterior lip reduced after several pushes.  The fetal vertex delivered easily in the right occiput anterior presentation.  There was a single loose nuchal cord that was reduced on the perineum.  The anterior shoulder delivered easily and the remainder of the body followed spontaneously.  The infant was placed on the patient's abdomen, where she was immediately vigorous and crying.  Cord clamping was delayed by 1 minute, at which time the cord was doubly clamped and cut by the father of the baby.  The placenta delivered spontaneously and was found to be intact with a 3-vessel cord.  The cervix, vagina, and perineum were inspected and noted to be intact.  The patient tolerated the procedure without difficulty, and she remained in her delivery room in stable condition.  Sponge, lap, and needle counts were correct.    Petty Vickers MD        D:  2022   T: 2022   MT: DAHLIA    Name:     JASWANT ROGEL  MRN:      [id number]        Account:       [id number]   :      1990           Delivery Date: 2022     Document: [id number]

## 2022-07-08 NOTE — PROVIDER NOTIFICATION
07/08/22 1412   Provider Notification   Provider Name/Title Dr. Vickers   Method of Notification At Bedside   Request Attend Delivery   SVE, 9 cm. Straight cathed.

## 2022-07-08 NOTE — PROVIDER NOTIFICATION
07/08/22 1349   Provider Notification   Provider Name/Title Dr. FABIANO Vickers   Method of Notification Electronic Page   Patient comfortable with epidural. SVE 8/90/-2. Dr. FABIANO Vickers web paged with update.

## 2022-07-08 NOTE — PROVIDER NOTIFICATION
07/08/22 1405   Provider Notification   Provider Name/Title Dr. FABIANO Vickers   Method of Notification Phone   Feels pressure, 9 cm, BBOW. Dr. Vickers called to come.

## 2022-07-08 NOTE — H&P
No significant change in general health status based on examination of the patient, review of Nursing Admission Database and prenatal record.    EFW: 8lb     Andressa Patterson is a 31 year old  @ 39w5d who presents in active labor. PNC uncomplicated, GBS negative. US showed persistent mildly dilated urinary collecting system, plan was made for f/u US in  period.    /62   SpO2 100%    [place]: q3  FHT 140s, mod variability, no decels  SVE 8/100/-1 AROM clear    A/P: 31 year old  @ 39w5d in labor  Anticipate     Petty Vickers MD

## 2022-07-08 NOTE — ANESTHESIA PROCEDURE NOTES
Epidural catheter Procedure Note    Pre-Procedure   Staff -        Anesthesiologist:  Valarie Vitale MD       Performed By: anesthesiologist       Procedure Start/Stop Times: 7/8/2022 1:17 PM and 7/8/2022 1:30 PM       Pre-Anesthestic Checklist: patient identified, IV checked, risks and benefits discussed, informed consent, monitors and equipment checked, pre-op evaluation and at physician/surgeon's request  Timeout:       Correct Patient: Yes        Correct Procedure: Yes        Correct Site: Yes        Correct Position: Yes   Procedure Documentation  Procedure: epidural catheter       Patient Position: sitting       Skin prep: Chloraprep       Local skin infiltrated with mL of 2% lidocaine.        Insertion Site: L2-3. (midline approach).       Technique: LORT air        BALDEV at 5 cm.       Needle Type: ToProbityy needle       Needle Gauge: 17.        Needle Length (Inches): 3.5        Catheter: 19 G.          Catheter threaded easily.           Threaded 10 cm at skin.         # of attempts: 2 and  # of redirects:  2    Assessment/Narrative         Paresthesias: Resolved.       Test dose of mL at.         Test dose negative, 3 minutes after injection, for signs of intravascular, subdural, or intrathecal injection.       Insertion/Infusion Method: LORT air       Aspiration negative for Heme or CSF via Epidural Catheter.       Sensory Level Left: T10.       Sensory Level Right: T10.    Medication(s) Administered   0.25% Bupivacaine PF (Epidural) - EPIDURAL   10 mL - 7/8/2022 1:25:00 PM  Medication Administration Time: 7/8/2022 1:17 PM

## 2022-07-08 NOTE — PLAN OF CARE
Data: Andressa Patterson, legs remain a little tingly. Helene care done and transferred to 433 via wheelchair at 1715. Baby transferred via parent's arms.  Action: Receiving unit notified of transfer: Yes. Patient and family notified of room change. Report given to Shena DIMAS at 1730. Belongings sent to receiving unit. Accompanied by Registered Nurse. Oriented patient to surroundings. Call light within reach. ID bands double-checked with receiving RN.  Response: Patient tolerated transfer and is stable.  Patients mobililty level scored using the bedside mobility assistance tool (BMAT). Patient is at a mobility level test number: 3. Mobility equipment used: wheelchair. Required assist of 1 staff members. Further use of BMAT scoring required.

## 2022-07-09 VITALS
TEMPERATURE: 98 F | SYSTOLIC BLOOD PRESSURE: 131 MMHG | HEART RATE: 73 BPM | RESPIRATION RATE: 18 BRPM | DIASTOLIC BLOOD PRESSURE: 70 MMHG | OXYGEN SATURATION: 100 %

## 2022-07-09 LAB — T PALLIDUM AB SER QL: NONREACTIVE

## 2022-07-09 PROCEDURE — 250N000013 HC RX MED GY IP 250 OP 250 PS 637: Performed by: OBSTETRICS & GYNECOLOGY

## 2022-07-09 RX ORDER — IBUPROFEN 800 MG/1
800 TABLET, FILM COATED ORAL EVERY 6 HOURS PRN
Qty: 15 TABLET | Refills: 0 | Status: SHIPPED | OUTPATIENT
Start: 2022-07-09 | End: 2022-07-24

## 2022-07-09 RX ADMIN — ACETAMINOPHEN 650 MG: 325 TABLET, FILM COATED ORAL at 04:52

## 2022-07-09 RX ADMIN — IBUPROFEN 800 MG: 800 TABLET, FILM COATED ORAL at 00:46

## 2022-07-09 RX ADMIN — ACETAMINOPHEN 650 MG: 325 TABLET, FILM COATED ORAL at 00:52

## 2022-07-09 RX ADMIN — PRENATAL VITAMINS-IRON FUMARATE 27 MG IRON-FOLIC ACID 0.8 MG TABLET 1 TABLET: at 09:15

## 2022-07-09 RX ADMIN — IBUPROFEN 800 MG: 800 TABLET, FILM COATED ORAL at 14:35

## 2022-07-09 RX ADMIN — ACETAMINOPHEN 650 MG: 325 TABLET, FILM COATED ORAL at 14:34

## 2022-07-09 RX ADMIN — IBUPROFEN 800 MG: 800 TABLET, FILM COATED ORAL at 06:43

## 2022-07-09 RX ADMIN — ACETAMINOPHEN 650 MG: 325 TABLET, FILM COATED ORAL at 09:14

## 2022-07-09 RX ADMIN — DOCUSATE SODIUM 100 MG: 100 CAPSULE, LIQUID FILLED ORAL at 09:14

## 2022-07-09 ASSESSMENT — ACTIVITIES OF DAILY LIVING (ADL)
ADLS_ACUITY_SCORE: 35
ADLS_ACUITY_SCORE: 35
DOING_ERRANDS_INDEPENDENTLY_DIFFICULTY: NO
WALKING_OR_CLIMBING_STAIRS_DIFFICULTY: NO
CONCENTRATING,_REMEMBERING_OR_MAKING_DECISIONS_DIFFICULTY: NO
ADLS_ACUITY_SCORE: 35
WEAR_GLASSES_OR_BLIND: NO
ADLS_ACUITY_SCORE: 35
TOILETING_ISSUES: NO
DIFFICULTY_EATING/SWALLOWING: NO
FALL_HISTORY_WITHIN_LAST_SIX_MONTHS: NO
ADLS_ACUITY_SCORE: 35
CHANGE_IN_FUNCTIONAL_STATUS_SINCE_ONSET_OF_CURRENT_ILLNESS/INJURY: NO
ADLS_ACUITY_SCORE: 35
DRESSING/BATHING_DIFFICULTY: NO
ADLS_ACUITY_SCORE: 35

## 2022-07-09 NOTE — PROGRESS NOTES
Data: Vital signs within normal limits. Postpartum checks within normal limits - see flow record. Patient eating and drinking normally. Patient able to empty bladder independently and is up ambulating. No apparent signs of infection.  Patient performing self cares and is able to care for infant.  Action: Patient medicated during the shift for pain with tylenol and ibuprofen. See MAR. Patient reassessed within 1 hour after each medication and pain was improved - patient stated she was comfortable. Patient education complete. See flow record.  Response: Positive attachment behaviors observed with infant. Support persons  present.   Plan: Anticipate discharge today. Patient to follow up in clinic in 2 and 8 weeks.

## 2022-07-09 NOTE — PROGRESS NOTES
Doing well. Desires discharge later today.    VSS afeb  Fundus firm and nt  Extremities nl    A: PPD 1 s/p      Doing well  P: Precautions reviewed       RTC 2 and 8 weeks.

## 2022-07-09 NOTE — PLAN OF CARE
Goals met: vitally stable, fundal checks, pain medications given with pain reassessment 1 hour after, PIV saline locked, voiding, no nausea, ambulating independently, bonding/cares for baby, education completed with all cares    Work in progress: Practicing breast feeding Q2-3hr (baby is sleepy at the breast, utilized hand expression), main complaint is uterine cramping

## 2022-07-09 NOTE — ANESTHESIA POSTPROCEDURE EVALUATION
Patient: Andressa Patterson    Procedure: * No procedures listed *       Anesthesia Type:  Epidural    Note:     Postop Pain Control:    PONV:    Neuro/Psych:    Airway/Respiratory:    CV/Hemodynamics:    Other NRE:    DID A NON-ROUTINE EVENT OCCUR?     Event details/Postop Comments:      S/P epidural for labor.   I or my partner was immediately available for management of this patient during epidural analgesia infusion.  VSS.  Doing well. Block resolved.  Neuro at baseline. Denies positional headache. Minimal side effects easily managed w/ PRN meds. No apparent anesthetic complications. No follow-up required.    Jose Pablo MD           Last vitals:  Vitals:    07/08/22 2103 07/09/22 0043 07/09/22 0728   BP: 127/79 119/73 125/73   Pulse: 80 80 73   Resp: 16 18 18   Temp: 98.3  F (36.8  C) 98.3  F (36.8  C) 98.5  F (36.9  C)   SpO2:          Electronically Signed By: Jose Pablo MD  July 9, 2022  7:42 AM

## 2022-07-09 NOTE — PLAN OF CARE
VS WNL.  Fundus firm, scant lochia.  Up independently, ambulation encouraged.  Voiding without difficulty.  Pain well managed with tylenol and IBU.  Breastfeeding with minimum staff assist; reinforced education on breastfeeding every 2-3 hours and to call for help prn.  Birth certificate completed and postpartum depression score 4.  Spouse at bedside and supportive.  Positive bonding with infant observed.  Pt desires discharge this evening, anticipate discharge pending infant 24 hour screens.

## 2022-07-16 ENCOUNTER — HEALTH MAINTENANCE LETTER (OUTPATIENT)
Age: 32
End: 2022-07-16

## 2022-09-18 ENCOUNTER — HEALTH MAINTENANCE LETTER (OUTPATIENT)
Age: 32
End: 2022-09-18

## 2023-07-30 ENCOUNTER — HEALTH MAINTENANCE LETTER (OUTPATIENT)
Age: 33
End: 2023-07-30

## 2024-09-22 ENCOUNTER — HEALTH MAINTENANCE LETTER (OUTPATIENT)
Age: 34
End: 2024-09-22

## 2024-10-29 ENCOUNTER — TELEPHONE (OUTPATIENT)
Dept: OBGYN | Facility: CLINIC | Age: 34
End: 2024-10-29
Payer: MEDICAID

## 2024-10-29 DIAGNOSIS — Z34.90 SUPERVISION OF NORMAL PREGNANCY: Primary | ICD-10-CM

## 2024-10-29 NOTE — TELEPHONE ENCOUNTER
"Pt is requesting to initiate care with Pete. She has had no prior care. Pt is 26 weeks or greater.    Last OV with current clinic: n/a    Due for next OV : ASAP     6 Para 3  (3 missed ab's and 3 vag deliveries)    LMP 24. EDC: 25.    U/S done? Had an US done at a \"free clinic\" in the beginning of October. Approx 22.     Anything abnml or f/up needed? N/a      Previous C-sec? no    List all major health problems: exercise induced astham    List all complications of past deliveries (GDM, BP, etc):  none    List all current pregnancy issues:  none  1 hour glucose test: n/a    List current medication list: pn vitamin  Pt records: n/a  *Update phone number if needed  Per protocol, message routed to provider on-call for direction.       Queenie CONDE RN  [facility]      "

## 2024-10-29 NOTE — TELEPHONE ENCOUNTER
Recommend visit within 2 wks. And a GCT at first visit (unless she can come sooner for labs and do her GCT at that time).  Recommend comprehensive ultrasound for anatomy.

## 2024-10-29 NOTE — TELEPHONE ENCOUNTER
[facility]    Phone Message    May a detailed message be left on voicemail: yes     Reason for Call: Other: Pt is 28 weeks and just scheduled initial prenatal visits. Pt hasn't had care for this pregnancy yet and no prior . Please advise      Action Taken: Message routed to:  Other: SHLMOO WELLER    Travel Screening: Not Applicable

## 2024-10-29 NOTE — TELEPHONE ENCOUNTER
Pt is scheduled for her intake tomorrow, 10/30.   Her anatomy scan on 11/5.  Md appt on 11/8.    Queenie CONDE RN  [facility]

## 2024-10-30 ENCOUNTER — VIRTUAL VISIT (OUTPATIENT)
Dept: OBGYN | Facility: CLINIC | Age: 34
End: 2024-10-30
Payer: MEDICAID

## 2024-10-30 DIAGNOSIS — Z34.80 PRENATAL CARE, SUBSEQUENT PREGNANCY, UNSPECIFIED TRIMESTER: Primary | ICD-10-CM

## 2024-10-30 PROCEDURE — 99207 PR NO CHARGE NURSE ONLY: CPT | Mod: 93

## 2024-10-30 RX ORDER — VITAMIN A ACETATE, .BETA.-CAROTENE, ASCORBIC ACID, CHOLECALCIFEROL, .ALPHA.-TOCOPHEROL ACETATE, DL-, THIAMINE MONONITRATE, RIBOFLAVIN, NIACINAMIDE, PYRIDOXINE HYDROCHLORIDE, FOLIC ACID, CYANOCOBALAMIN, CALCIUM CARBONATE, FERROUS FUMARATE, ZINC OXIDE, AND CUPRIC OXIDE 2000; 2000; 120; 400; 22; 1.84; 3; 20; 10; 1; 12; 200; 27; 25; 2 [IU]/1; [IU]/1; MG/1; [IU]/1; MG/1; MG/1; MG/1; MG/1; MG/1; MG/1; UG/1; MG/1; MG/1; MG/1; MG/1
1 TABLET ORAL DAILY
COMMUNITY
End: 2024-10-30

## 2024-10-30 NOTE — PROGRESS NOTES
NPN nurse visit done over the phone. Pt will be given NPN folder and book at her upcoming appt.  Discussed optional screening available to assess chromosomal anomalies. Questions answered. Informed pt of the clinic structure (on-call does deliveries for the day, may be male or female doctor). Pt advised to call the clinic if she has any questions or concerns related to her pregnancy. Prenatal labs will be obtained at her upcoming appt. New prenatal visit scheduled on 11/8/24 with Dr Guevara.    26w5d    Menstrual cycles: regular every 4 weeks   Date of positive pregnancy test: July 2024  Medications stopped upon pos HPT: none    Last pap: none in system    Late care due to no insurance. No concerns so far.    Patient supplied answers from flow sheet for:  Prenatal OB Questionnaire.  Past Medical History  Have you ever recieved care for your mental health? : No  Have you ever been in a major accident or suffered serious trauma?: No  Within the last year, has anyone hit, slapped, kicked or otherwise hurt you?: No  In the last year, has anyone forced you to have sex when you didn't want to?: No    Past Medical History 2   Have you ever received a blood transfusion?: No  Would you accept a blood transfusion if was medically recommended?: Yes  Does anyone in your home smoke?: No   Is your blood type Rh negative?: No  Have you ever ?: (!) Yes  Have you been hospitalized for a nonsurgical reason excluding normal delivery?: (!) Yes (ectopic)  Have you ever had an abnormal pap smear?: No    Past Medical History (Continued)  Do you have a history of abnormalities of the uterus?: No  Did your mother take RAZIA or any other hormones when she was pregnant with you?: No  Do you have any other problems we have not asked about which you feel may be important to this pregnancy?: No

## 2024-11-04 LAB
ABO/RH(D): NORMAL
ANTIBODY SCREEN: NEGATIVE
SPECIMEN EXPIRATION DATE: NORMAL

## 2024-11-05 ENCOUNTER — LAB (OUTPATIENT)
Dept: LAB | Facility: CLINIC | Age: 34
End: 2024-11-05
Payer: MEDICAID

## 2024-11-05 DIAGNOSIS — Z34.80 PRENATAL CARE, SUBSEQUENT PREGNANCY, UNSPECIFIED TRIMESTER: ICD-10-CM

## 2024-11-05 LAB
ERYTHROCYTE [DISTWIDTH] IN BLOOD BY AUTOMATED COUNT: 12.5 % (ref 10–15)
EST. AVERAGE GLUCOSE BLD GHB EST-MCNC: 97 MG/DL
HBA1C MFR BLD: 5 % (ref 0–5.6)
HBV SURFACE AG SERPL QL IA: NONREACTIVE
HCT VFR BLD AUTO: 36.3 % (ref 35–47)
HCV AB SERPL QL IA: NONREACTIVE
HGB BLD-MCNC: 12.6 G/DL (ref 11.7–15.7)
HIV 1+2 AB+HIV1 P24 AG SERPL QL IA: NONREACTIVE
MCH RBC QN AUTO: 31 PG (ref 26.5–33)
MCHC RBC AUTO-ENTMCNC: 34.7 G/DL (ref 31.5–36.5)
MCV RBC AUTO: 89 FL (ref 78–100)
PLATELET # BLD AUTO: 259 10E3/UL (ref 150–450)
RBC # BLD AUTO: 4.06 10E6/UL (ref 3.8–5.2)
WBC # BLD AUTO: 9.2 10E3/UL (ref 4–11)

## 2024-11-05 PROCEDURE — 86762 RUBELLA ANTIBODY: CPT

## 2024-11-05 PROCEDURE — 87340 HEPATITIS B SURFACE AG IA: CPT

## 2024-11-05 PROCEDURE — 85027 COMPLETE CBC AUTOMATED: CPT

## 2024-11-05 PROCEDURE — 86850 RBC ANTIBODY SCREEN: CPT

## 2024-11-05 PROCEDURE — 86803 HEPATITIS C AB TEST: CPT

## 2024-11-05 PROCEDURE — 86901 BLOOD TYPING SEROLOGIC RH(D): CPT

## 2024-11-05 PROCEDURE — 87086 URINE CULTURE/COLONY COUNT: CPT

## 2024-11-05 PROCEDURE — 36415 COLL VENOUS BLD VENIPUNCTURE: CPT

## 2024-11-05 PROCEDURE — 87389 HIV-1 AG W/HIV-1&-2 AB AG IA: CPT

## 2024-11-05 PROCEDURE — 83036 HEMOGLOBIN GLYCOSYLATED A1C: CPT

## 2024-11-05 PROCEDURE — 86900 BLOOD TYPING SEROLOGIC ABO: CPT

## 2024-11-05 PROCEDURE — 86780 TREPONEMA PALLIDUM: CPT

## 2024-11-06 LAB
RUBV IGG SERPL QL IA: 9.78 INDEX
RUBV IGG SERPL QL IA: POSITIVE
T PALLIDUM AB SER QL: NONREACTIVE

## 2024-11-07 ENCOUNTER — ANCILLARY PROCEDURE (OUTPATIENT)
Dept: ULTRASOUND IMAGING | Facility: CLINIC | Age: 34
End: 2024-11-07
Attending: OBSTETRICS & GYNECOLOGY
Payer: MEDICAID

## 2024-11-07 DIAGNOSIS — Z34.80 PRENATAL CARE, SUBSEQUENT PREGNANCY, UNSPECIFIED TRIMESTER: ICD-10-CM

## 2024-11-07 LAB — BACTERIA UR CULT: NORMAL

## 2024-11-07 PROCEDURE — 76805 OB US >/= 14 WKS SNGL FETUS: CPT | Performed by: OBSTETRICS & GYNECOLOGY

## 2024-11-08 ENCOUNTER — PRENATAL OFFICE VISIT (OUTPATIENT)
Dept: OBGYN | Facility: CLINIC | Age: 34
End: 2024-11-08
Payer: MEDICAID

## 2024-11-08 VITALS — WEIGHT: 177.9 LBS | DIASTOLIC BLOOD PRESSURE: 60 MMHG | BODY MASS INDEX: 33.61 KG/M2 | SYSTOLIC BLOOD PRESSURE: 108 MMHG

## 2024-11-08 DIAGNOSIS — Z34.83 ENCOUNTER FOR SUPERVISION OF OTHER NORMAL PREGNANCY IN THIRD TRIMESTER: Primary | ICD-10-CM

## 2024-11-08 PROCEDURE — 87491 CHLMYD TRACH DNA AMP PROBE: CPT | Performed by: OBSTETRICS & GYNECOLOGY

## 2024-11-08 PROCEDURE — 87591 N.GONORRHOEAE DNA AMP PROB: CPT | Performed by: OBSTETRICS & GYNECOLOGY

## 2024-11-08 PROCEDURE — 99203 OFFICE O/P NEW LOW 30 MIN: CPT | Performed by: OBSTETRICS & GYNECOLOGY

## 2024-11-08 NOTE — PROGRESS NOTES
New OB Visit  Jaswant Patterson   2024   28w0d     Subjective: Jaswant Patterson 34 year old  at 28w0d dated by LMP, and 27w U/S here today for initial OB visit.Estimated Date of Delivery: 2025. Patient reports No Problems. Denies cramping and vaginal spotting.  She is presenting late for care due to loss of insurance.  She is certain of her last menstrual period and had a home pregnancy test shortly after missing her menses so she believes her dating is accurate    Gyn History:   Menses: LMP: Patient's last menstrual period was 2024 (exact date). frequency: q month  Sexually transmitted disease history: none.    Occupation: not working  Exercise: active  Diet: balanced  H/o Chicken Pox or Varicella Vaccination: Yes    no prior history of hypertension, DM, asthma.    History of GDM: No   Hx PTL : No   History of HTN in pregnancy: No   Shoulder dystocia: No   Vacuum Extraction: No   PPH: No   3rd of 4th degree laceration: No   Other complications: No    Since her last LMP she denies use of alcohol, tobacco and street drugs.    OBhx:  x 3  ectopic pregnancy treated with Methotrexate, followed by salpingectomy  OB History    Para Term  AB Living   6 3 3 0 2 3   SAB IAB Ectopic Multiple Live Births   1 0 1 0 3      # Outcome Date GA Lbr [place]/2nd Weight Sex Type Anes PTL Lv   6 Current            5 Term 22 39w5d 03:38 / 00:03 3.39 kg (7 lb 7.6 oz) F Vag-Spont EPI  LUIS MANUEL      Name: RORY PATTERSON      Apgar1: 8  Apgar5: 9   4 Term 21 39w0d 03:30 / 00:09 3.459 kg (7 lb 10 oz) F Vag-Spont EPI  LUIS MANUEL      Name: WILLIAMFEMALE-JASWANT      Apgar1: 9  Apgar5: 9   3 Ectopic 2020           2 SAB            1 Term 13 37w0d   M Vag-Spont   LUIS MANUEL       ROS: Ten point review of systems was reviewed and negative except the above.    HISTORY:  Past Medical History:   Diagnosis Date    Depressive disorder     Heart murmur     as a baby    Uncomplicated asthma     exercise, no  inhaler needed     Past Surgical History:   Procedure Laterality Date    APPENDECTOMY  2010    LAPAROSCOPIC EVACUATION ECTOPIC PREGNANCY Right 02/14/2020    Procedure: SINGLE SITE LAPAROSCOPY, RIGHT SALPINGECTOMY, LYSIS OF ADHESIONS, REMOVAL OF PARATUBAL CYST;  Surgeon: Mamadou Ratliff MD;  Location: [facility] OR    ORTHOPEDIC SURGERY Left 2010    knee surgery     Family History   Problem Relation Age of Onset    No Known Problems Mother     No Known Problems Father     Breast Cancer Maternal Aunt      Social History     Socioeconomic History    Marital status:      Spouse name: Hua    Number of children: None    Years of education: None    Highest education level: None   Occupational History    Occupation: not working   Tobacco Use    Smoking status: Never    Smokeless tobacco: Never   Vaping Use    Vaping status: Never Used   Substance and Sexual Activity    Alcohol use: Not Currently    Drug use: Never    Sexual activity: Yes     Partners: Male     Current Outpatient Medications   Medication Sig Dispense Refill    Prenatal MV & Min w/FA-DHA (PRENATAL GUMMIES) 0.18-25 MG CHEW Take by mouth.       No current facility-administered medications for this visit.     No Known Allergies    Past medical, surgical, social and family history were reviewed and updated in [facility].      EXAM:  LMP 04/26/2024 (Exact Date)      Gen:  no acute distress, comfortable  HENT: No scleral injection or icterus  CV: Regular rate and rhythm, no murmur  Resp: CTAB, Normal work of breathing, no cough  GI: Abdomen soft, non-tender. No masses, organomegaly  Skin: No suspicious lesions or rashes  Psychiatric: mentation appears normal and affect bright   Pelvis:    +      Recent Labs   Lab Test 11/05/24  1251 07/08/22  1351 02/02/21  1000   ABO  --   --  A   RH  --   --  Pos   AS Negative   < > Neg    < > = values in this interval not displayed.     Rhogam not indicated     Recent Labs   Lab Test 11/05/24  1251 01/12/21  0000    HEPBANG Nonreactive  --    HIAGAB Nonreactive  --    GBS  --  negative   RUQIGG Positive  --        Treponemal antibody neg    CBC RESULTS:   Recent Labs   Lab Test 24  1251   WBC 9.2   RBC 4.06   HGB 12.6   HCT 36.3   MCV 89   MCH 31.0   MCHC 34.7   RDW 12.5          ASSESSMENT:  34 year old  at 28w0d dated by certain LMP and 27-week ultrasound here for NOB visit.    Late care doing insurance change but patient confident in her dating    PLAN:    1)Prenatal labs reviewed. She has no questions.  2) EDUCATION : RECOMMENDED WEIGHT GAIN: 25-35 lbs given There is no height or weight on file to calculate BMI..   - Instructed on best evidence for: healthy diet and foods to avoid; exercise and activity during pregnancy; and maintenance of a generally healthy lifestyle. Reviewed early pregnancy education, provider coverage, labor and delivery, and prenatal visits.  Discussed the harms, benefits, side effects and alternative therapies for current prescribed and OTC medications.  - recommend PNV  3) Discussed options for screening for chromosomal anomalies, including first screen, noninvasive prenatal testing, CVS/amniocentesis, quad screen, and ultrasound at 18-20 weeks.  Due to her late presentation for care above screening will not be necessary  4) history of 3 's    Follow up in 4 weeks. She is encouraged to call sooner with questions or concerns.    Curly Guevara MD   Obstetrics and Gynecology

## 2024-11-09 LAB
C TRACH DNA SPEC QL NAA+PROBE: NEGATIVE
N GONORRHOEA DNA SPEC QL NAA+PROBE: NEGATIVE

## 2024-12-04 ENCOUNTER — PRENATAL OFFICE VISIT (OUTPATIENT)
Dept: OBGYN | Facility: CLINIC | Age: 34
End: 2024-12-04

## 2024-12-04 VITALS — SYSTOLIC BLOOD PRESSURE: 104 MMHG | DIASTOLIC BLOOD PRESSURE: 66 MMHG | BODY MASS INDEX: 34.77 KG/M2 | WEIGHT: 184 LBS

## 2024-12-04 DIAGNOSIS — O99.810 ABNORMAL MATERNAL GLUCOSE TOLERANCE, ANTEPARTUM: Primary | ICD-10-CM

## 2024-12-04 DIAGNOSIS — Z34.83 ENCOUNTER FOR SUPERVISION OF OTHER NORMAL PREGNANCY IN THIRD TRIMESTER: Primary | ICD-10-CM

## 2024-12-04 LAB
ERYTHROCYTE [DISTWIDTH] IN BLOOD BY AUTOMATED COUNT: 12.4 % (ref 10–15)
GLUCOSE 1H P 50 G GLC PO SERPL-MCNC: 175 MG/DL (ref 70–129)
HCT VFR BLD AUTO: 33.8 % (ref 35–47)
HGB BLD-MCNC: 11.6 G/DL (ref 11.7–15.7)
MCH RBC QN AUTO: 30.4 PG (ref 26.5–33)
MCHC RBC AUTO-ENTMCNC: 34.3 G/DL (ref 31.5–36.5)
MCV RBC AUTO: 89 FL (ref 78–100)
PLATELET # BLD AUTO: 239 10E3/UL (ref 150–450)
RBC # BLD AUTO: 3.82 10E6/UL (ref 3.8–5.2)
T PALLIDUM AB SER QL: NONREACTIVE
WBC # BLD AUTO: 10.5 10E3/UL (ref 4–11)

## 2024-12-04 PROCEDURE — 99207 PR PRENATAL VISIT: CPT | Performed by: OBSTETRICS & GYNECOLOGY

## 2024-12-04 PROCEDURE — 90471 IMMUNIZATION ADMIN: CPT | Performed by: OBSTETRICS & GYNECOLOGY

## 2024-12-04 PROCEDURE — 82950 GLUCOSE TEST: CPT | Performed by: OBSTETRICS & GYNECOLOGY

## 2024-12-04 PROCEDURE — 85027 COMPLETE CBC AUTOMATED: CPT | Performed by: OBSTETRICS & GYNECOLOGY

## 2024-12-04 PROCEDURE — 86780 TREPONEMA PALLIDUM: CPT | Performed by: OBSTETRICS & GYNECOLOGY

## 2024-12-04 PROCEDURE — 90715 TDAP VACCINE 7 YRS/> IM: CPT | Performed by: OBSTETRICS & GYNECOLOGY

## 2024-12-04 PROCEDURE — 36415 COLL VENOUS BLD VENIPUNCTURE: CPT | Performed by: OBSTETRICS & GYNECOLOGY

## 2024-12-04 NOTE — PROGRESS NOTES
34-year-old G6, P3 at 31 weeks 5 days.  GCT today.  Tdap today.  Feels well.  Baby active.  No concerns.  It is a boy named Leon.  Return to clinic 2 weeks  
Yes

## 2024-12-18 ENCOUNTER — LAB (OUTPATIENT)
Dept: LAB | Facility: CLINIC | Age: 34
End: 2024-12-18
Payer: COMMERCIAL

## 2024-12-18 DIAGNOSIS — O99.810 ABNORMAL MATERNAL GLUCOSE TOLERANCE, ANTEPARTUM: ICD-10-CM

## 2024-12-18 LAB
GESTATIONAL GTT 1 HR POST DOSE: 180 MG/DL (ref 60–179)
GESTATIONAL GTT 2 HR POST DOSE: 119 MG/DL (ref 60–154)
GESTATIONAL GTT 3 HR POST DOSE: 112 MG/DL (ref 60–139)
GLUCOSE P FAST SERPL-MCNC: 98 MG/DL (ref 60–94)

## 2024-12-18 PROCEDURE — 36415 COLL VENOUS BLD VENIPUNCTURE: CPT

## 2024-12-18 PROCEDURE — 82952 GTT-ADDED SAMPLES: CPT

## 2024-12-18 PROCEDURE — 82951 GLUCOSE TOLERANCE TEST (GTT): CPT

## 2024-12-19 DIAGNOSIS — O99.810 ABNORMAL MATERNAL GLUCOSE TOLERANCE, ANTEPARTUM: Primary | ICD-10-CM

## 2024-12-23 ENCOUNTER — VIRTUAL VISIT (OUTPATIENT)
Dept: EDUCATION SERVICES | Facility: CLINIC | Age: 34
End: 2024-12-23
Attending: OBSTETRICS & GYNECOLOGY
Payer: COMMERCIAL

## 2024-12-23 DIAGNOSIS — O99.810 ABNORMAL MATERNAL GLUCOSE TOLERANCE, ANTEPARTUM: Primary | ICD-10-CM

## 2024-12-23 PROCEDURE — G0109 DIAB MANAGE TRN IND/GROUP: HCPCS | Mod: 95

## 2024-12-23 RX ORDER — LANCETS
EACH MISCELLANEOUS
Qty: 200 EACH | Refills: 0 | Status: SHIPPED | OUTPATIENT
Start: 2024-12-23

## 2024-12-23 NOTE — LETTER
12/23/2024         RE: Andressa Patterson  [address] [place] [address]        Dear Colleague,    [facility] you for referring your patient, [facility] Patterson[facility] Please see a copy of my visit note below.    No notes on file

## 2024-12-23 NOTE — GROUP NOTE
"Diabetes Diabetes Self-Management Education & Support  12/23/2024  Virtual GDM Class via Medical Zoom  Patient Location: [place]  Provider Location: Home - [facility]    Start and End Time  Start Time: 1306  End Time: 1414    Subjective/Objective  Andressa is an 34 year old year old, presenting for the following diabetes education related to: Abnormal maternal glucose tolerance, antepartum.    How confident are you filling out medical forms by yourself:: (Patient-Rptd) Extremely    Cultural Influences/Ethnic Background:   or     Estimated Date of Delivery: Jan 31, 2025    1 hour OGTT  Lab Results   Component Value Date    GLU1 175 (H) 12/04/2024       3 hour OGTT    Fasting  No results found for: \"GLF\"    1 hour  No results found for: \"GL1\"    2 hour  No results found for: \"GL2\"    3 hour  No results found for: \"GL3\"    INTERVENTION:   Educational topics covered today:  GDM diagnosis, pathophysiology, risks and complications of GDM, means of controlling GDM, using a blood glucose monitor, blood glucose goals, logging and interpreting glucose results, ketone testing, when to call a diabetes educator or OB provider, healthy eating during pregnancy, counting carbohydrates, meal planning for GDM, and physical activity    Educational materials emailed today:   Pete Understanding Gestational Diabetes  GDM Log Sheet  Sharps Disposal  Care After Delivery  GDM Food Log Sheet  HS Snack List    Plan  Test glucose 4 times per day:   Fasting (when you first awake for the day): 95 mg/dL or below   1 hour after breakfast: 140 mg/dL or below   1 hour after lunch: 140 mg/dL or below   1 hour after dinner: 140 mg/dL or below     Please bring your meter and log book to all appointments     If you miss 1 hour after meal test, test 2 hours after the meal.  Goal 2 hours after is 120 mg/dL or below.     2.  Check your urine ketones once a day, when you first awake for the day until they are negative to trace for 7 days in a " row.  Then decrease and check once a week.     3.  Meal Plan    Breakfast: 30 grams carbohydrate + protein   Snack: 15-30 grams carbohydrate + protein  Lunch: 45-60 grams carbohydrate + protein  Snack: 15-30 grams carbohydrate + protein  Dinner: 45-60 grams carbohydrate + protein  Snack: 15-30 grams carbohydrate + protein    A few tips:   -consume some carbohydrate every 2-3 hours while awake   -you need a minimum of 175 grams of carbohydrate per day   -fruit and cold breakfast cereal are best tolerated at lunch or later   -protein includes: cheese, eggs, fish, nuts, nut butter, chicken, turkey, beef, and pork   -snack ideas: an individual container of Greek yogurt (try Chobani Less Sugar), whole grain crackers and cheese, chocolate fairlife milk, a Kashi or KIND bar, a baseball size piece of whole fruit + nut butter (apple + peanut butter), fruit canned in it's own juice + cottage cheese    4.  Aim for 20-30 minutes of activity most days of the week (with the okay of your OB provider).      5. Call Diabetes Education at [phone] or send a Qwilt message with:   -questions or concerns   -ketones that are small, moderate, or large   -3 or more blood sugars above target in a 7 day period    Erica Donovan, MPH, RD, CDCES, LD 12/23/2024

## 2024-12-23 NOTE — PATIENT INSTRUCTIONS
Test glucose 4 times per day:   Fasting (when you first awake for the day): 95 mg/dL or below   1 hour after breakfast: 140 mg/dL or below   1 hour after lunch: 140 mg/dL or below   1 hour after dinner: 140 mg/dL or below     Please bring your meter and log book to all appointments     If you miss 1 hour after meal test, test 2 hours after the meal.  Goal 2 hours after is 120 mg/dL or below.     2.  Check your urine ketones once a day, when you first awake for the day until they are negative to trace for 7 days in a row.  Then decrease and check once a week.     3.  Meal Plan    Breakfast: 30 grams carbohydrate + protein   Snack: 15-30 grams carbohydrate + protein  Lunch: 45-60 grams carbohydrate + protein  Snack: 15-30 grams carbohydrate + protein  Dinner: 45-60 grams carbohydrate + protein  Snack: 15-30 grams carbohydrate + protein    A few tips:   -consume some carbohydrate every 2-3 hours while awake   -you need a minimum of 175 grams of carbohydrate per day   -fruit and cold breakfast cereal are best tolerated at lunch or later   -protein includes: cheese, eggs, fish, nuts, nut butter, chicken, turkey, beef, and pork   -snack ideas: an individual container of Greek yogurt (try Chobani Less Sugar), whole grain crackers and cheese, chocolate fairlife milk, a Kashi or KIND bar, a baseball size piece of whole fruit + nut butter (apple + peanut butter), fruit canned in it's own juice + cottage cheese    4.  Aim for 20-30 minutes of activity most days of the week (with the okay of your OB provider).      5.  Call Diabetes Education at [phone] or send a ComActivity message with:   -questions or concerns   -ketones that are small, moderate, or large   -3 or more blood sugars above target in a 7 day period    Thank you,     Erica Doonvan, MPH, RD, CDCES, LD 12/23/2024

## 2024-12-24 DIAGNOSIS — O24.419 GESTATIONAL DIABETES MELLITUS (GDM) IN THIRD TRIMESTER, GESTATIONAL DIABETES METHOD OF CONTROL UNSPECIFIED: Primary | ICD-10-CM

## 2025-01-06 ENCOUNTER — VIRTUAL VISIT (OUTPATIENT)
Dept: EDUCATION SERVICES | Facility: CLINIC | Age: 35
End: 2025-01-06
Payer: COMMERCIAL

## 2025-01-06 DIAGNOSIS — O24.419 GESTATIONAL DIABETES MELLITUS (GDM) IN THIRD TRIMESTER, GESTATIONAL DIABETES METHOD OF CONTROL UNSPECIFIED: ICD-10-CM

## 2025-01-06 PROCEDURE — G0108 DIAB MANAGE TRN  PER INDIV: HCPCS | Mod: 95 | Performed by: DIETITIAN, REGISTERED

## 2025-01-06 NOTE — PROGRESS NOTES
Diabetes Self-Management Education & Support    Type of service:  Video Visit    If the video visit is dropped, the video visit invitation should be resent by: Text to cell phone: [phone]    Originating Location (pt. Location): Home  Distant Location (provider location): Offsite  Mode of Communication:  Video Conference via [employer]    Video Start Time:  2:01pm  Video End Time (time video stopped): 2:44pm    How would patient like to obtain AVS? MyChart      Assessment  Ketones: negative.   Fasting blood glucoses: 36% in target.  After breakfast: 85% in target.  After lunch: 89% in target.  Before dinner: 50% in target  After dinner: 100% in target.    Andressa is seeing sub-optimal blood glucose in target but upon further discussion, since she has a weird sleep schedule, she is notices she has an elevated fasting blood glucose when she eats fruit at 3-4am and checks blood glucose at 6am, otherwise it is <95 mg/dL if she doesn't eat anything. This would not be a true fasting as she is eating food about 2-3 hours before she checks, which may be falsely raising her blood glucose. She normally eats at 6am with son, sees him on the bus, and will go back to sleep from 7:30-8am until her other 2 kids wake up around 3pm.     Since she is used to her current checking schedule, recommended she eat more carbohydrates with dinner to see if lowers hunger overnight. If she is still hungry after this change, suggested she try to eat  a low-carbohydrate snack such as cheese stick, an eggs, greens or small portion of nuts instead of fruit. If her 6am check is still high, may need some insulin to help control her blood glucose overnight. Did offer to demonstrate on insulin in case needed but she declines as she feels this would add stress; wants to wait until know if needed. Will plan for her to send in blood glucose on Thursday but also has a follow up video visit on Monday.     She is seeing negative ketones the past 2 weeks  so told ok to reduce checking ketones to 1 time a week. She is seeing good post-prandial blood glucose control. Did provide education on care after delivery and preventing diabetes today. Pt verbalized understanding of concepts discussed and recommendations provided.       Intervention  Educational topics covered today:  What to expect after delivery, future testing for Type 2 diabetes (2 hour OGTT at 6 week post-partum check-up and annual fasting blood glucose level), risk of GDM and planning ahead for future pregnancies, recommended lifestyle interventions for reducing the risk of Type 2 Diabetes, when to call a Diabetes Educator or OB provider    Educational Materials provided today:  [place] Preventing Diabetes    Patient verbalized understanding of diabetes self-management education concepts discussed, opportunities for ongoing education and support, and recommendations provided today    Plan  Check glucose four times daily, before breakfast and 1 hour after each meal.  Check ketones once a week when readings are consistently negative.  Continue with recommended physical activity.  Continue to follow recommended meal plan: 30-45g carbohydratess at breakfast, 45-60g carbohydratess at lunch, 45-60g carbohydrates at supper, 15-30g carbohydrates at snacks.  Follow consistent carbohydrate meal plan, eat carbohydrates and protein/fat at all meals/snacks.    Send in Glucose Log (blood sugars) via [employer] in 3-4 days. If 3 or more blood sugars are above the goal at a given time, or if Ketones are small, moderate or large, call or tritruehart message the diabetes educator.    Follow-up:    Follow up on Upcoming Diabetes Ed Appointments     Visit Type Date Time Department    GDM FOLLOW UP 1/6/2025  2:00 PM [facility]    GDM ED SHORT 1/13/2025  7:30 AM  [facility]        Subjective/Objective  Andressa is an 34 year old year old, presenting for the following diabetes education related to:      Accompanied by:  Self  Diabetes management related comments/concerns: No questions right now. Says it is an adjustment but found it easy to comprehend. Says her sleep schedule is off. Says she is eating 4-5 smaller meals. Says she has been learning more about food and what she can and cannot. Says she sets an hour timer from the start of the meal.    Says if she has a high blood glucose in the morning, usually from eating fruit overnight. Will eat an apple/orange/grapes- eating at 3-4 am. Normally wakes at 6am to check fasting blood glucose and eat breakfast. Then will sleep until 3pm. She sometimes fasts during the day until dinner if not hungry, which is when she will sometimes check blood glucose before dinner.        Gestational weeks: 36w, 3d  Number of previous pregnancies: 5  Had any babies over 9 lbs: Yes  Previously had Gestational Diabetes: No  Have you ever had thyroid problems or taken thyroid medication?: No  Heart disease, mitral valve prolapse or rheumatic fever?: Unknown  Hypertension : No  High Cholesterol: No  High Triglycerides: No  Do you use tobacco products?: No  Do you drink beer, wine or hard liquor?: No    Cultural Influences/Ethnic Background:   or       LMP 04/26/2024 (Exact Date)     Weight gain 39 lbs at 36.5 weeks gestation.    Estimated Date of Delivery: Jan 31, 2025    Blood Glucose/Ketone Log:   Date Ketones Fasting Post Breakfast Post Lunch Pre/Post Supper   12/24 neg 95 - - 113/93   12/25 neg 99 113 - 113/93   12/26 neg 90 150 129 95   12/27 neg 83 122* 91 101   12/28 neg 96 138 113* 117   12/29 neg 97 137 127 118   12/30 neg 97 107 - 90/125   12/31 neg 97 107 105 117   1/1 neg 87 88 135 126   1/2 neg 101 85 146 113   1/3 neg 85 114 - 81/119*   1/4 neg 107 117 85 119   1/5 neg 107 124 118 86   1/6 neg 85 124         Healthy Eating:  Pre-pregnancy weight (lbs): 155  Exercise:: Yes  Days per week of moderate to strenuous exercise (like a brisk walk): 3  On average, minutes per day of  exercise at this level: 10  How intense was your typical exercise? : Light (like stretching or slow walking)  Exercise Minutes per Week: 30  Barrier to exercise: Safety, Physical limitation  Cultural/Rastafarian diet restrictions?: No  Meal planning/habits: Avoiding sweets, Calorie counting, Heart healthy, Low salt, Smaller portions, Frequent snacking  How many times a week on average do you eat food made away from home (restaurant/take-out)?: 1  Meals include: Breakfast, Lunch, Dinner  Breakfast: wakes:6am:: 2 eggs, avocado, 1 slice wheat toast and 6 oz black coffee. Goes back to sleep after breakfast until 3pm.  Lunch: chicken and sausage gumbo with 1 cup white rice and small glass juice  Dinner: 11pm: chicken wings OR meat, greens, juice, milk  Snacks: PM: veggies - cucumber and cheese  Other: None  Beverages: Water, Coffee, Juice  How many servings of fruits/vegetables per day: 4  Biggest challenges to healthy eating: Other (Small appetite)  Pre-mo vitamin?: Yes  Supplements?: No  Experiencing nausea?: No  Experiencing heartburn?: Yes    Healthy Coping:  Emotional response to diabetes: Ready to learn  Informal Support system:: Children, Billie based, Family, Friends, Parent, Partner, Shelter, Significant other, Spouse  Stage of change: ACTION (Actively working towards change)    Current Management:  Taking medications for gestational diabetes?: No  Difficulty affording diabetes testing supplies?: No    Lora Back RD  Time Spent: 43 minutes  Encounter Type: Individual     Diabetes medication dose changes were made via the CDCES Standing Orders under the patient's referring provider.

## 2025-01-06 NOTE — LETTER
1/6/2025         RE: Andressa Patterson  [address] [place] [address]        Dear Colleague,    Thank you for referring your patient, Andressa Patterson, to the [facility]. Please see a copy of my visit note below.    Diabetes Self-Management Education & Support    Type of service:  Video Visit    If the video visit is dropped, the video visit invitation should be resent by: Text to cell phone: [phone]    Originating Location (pt. Location): Home  Distant Location (provider location): Offsite  Mode of Communication:  Video Conference via [employer]    Video Start Time:  2:01pm  Video End Time (time video stopped): 2:44pm    How would patient like to obtain AVS? MyChart      Assessment  Ketones: negative.   Fasting blood glucoses: 36% in target.  After breakfast: 85% in target.  After lunch: 89% in target.  Before dinner: 50% in target  After dinner: 100% in target.    Andressa is seeing sub-optimal blood glucose in target but upon further discussion, since she has a weird sleep schedule, she is notices she has an elevated fasting blood glucose when she eats fruit at 3-4am and checks blood glucose at 6am, otherwise it is <95 mg/dL if she doesn't eat anything. This would not be a true fasting as she is eating food about 2-3 hours before she checks, which may be falsely raising her blood glucose. She normally eats at 6am with son, sees him on the bus, and will go back to sleep from 7:30-8am until her other 2 kids wake up around 3pm.     Since she is used to her current checking schedule, recommended she eat more carbohydrates with dinner to see if lowers hunger overnight. If she is still hungry after this change, suggested she try to eat  a low-carbohydrate snack such as cheese stick, an eggs, greens or small portion of nuts instead of fruit. If her 6am check is still high, may need some insulin to help control her blood glucose overnight. Did offer to demonstrate on insulin in case needed  but she declines as she feels this would add stress; wants to wait until know if needed. Will plan for her to send in blood glucose on Thursday but also has a follow up video visit on Monday.     She is seeing negative ketones the past 2 weeks so told ok to reduce checking ketones to 1 time a week. She is seeing good post-prandial blood glucose control. Did provide education on care after delivery and preventing diabetes today. Pt verbalized understanding of concepts discussed and recommendations provided.       Intervention  Educational topics covered today:  What to expect after delivery, future testing for Type 2 diabetes (2 hour OGTT at 6 week post-partum check-up and annual fasting blood glucose level), risk of GDM and planning ahead for future pregnancies, recommended lifestyle interventions for reducing the risk of Type 2 Diabetes, when to call a Diabetes Educator or OB provider    Educational Materials provided today:  [place] Preventing Diabetes    Patient verbalized understanding of diabetes self-management education concepts discussed, opportunities for ongoing education and support, and recommendations provided today    Plan  Check glucose four times daily, before breakfast and 1 hour after each meal.  Check ketones once a week when readings are consistently negative.  Continue with recommended physical activity.  Continue to follow recommended meal plan: 30-45g carbohydratess at breakfast, 45-60g carbohydratess at lunch, 45-60g carbohydrates at supper, 15-30g carbohydrates at snacks.  Follow consistent carbohydrate meal plan, eat carbohydrates and protein/fat at all meals/snacks.    Send in Glucose Log (blood sugars) via [employer] in 3-4 days. If 3 or more blood sugars are above the goal at a given time, or if Ketones are small, moderate or large, call or MyChart message the diabetes educator.    Follow-up:    Follow up on Upcoming Diabetes Ed Appointments     Visit Type Date Time Department    GDM  FOLLOW UP 1/6/2025  2:00 PM [facility]    GDM ED SHORT 1/13/2025  7:30 AM [facility]        Subjective/Objective  Andressa is an 34 year old year old, presenting for the following diabetes education related to:      Accompanied by: Self  Diabetes management related comments/concerns: No questions right now. Says it is an adjustment but found it easy to comprehend. Says her sleep schedule is off. Says she is eating 4-5 smaller meals. Says she has been learning more about food and what she can and cannot. Says she sets an hour timer from the start of the meal.    Says if she has a high blood glucose in the morning, usually from eating fruit overnight. Will eat an apple/orange/grapes- eating at 3-4 am. Normally wakes at 6am to check fasting blood glucose and eat breakfast. Then will sleep until 3pm. She sometimes fasts during the day until dinner if not hungry, which is when she will sometimes check blood glucose before dinner.        Gestational weeks: 36w, 3d  Number of previous pregnancies: 5  Had any babies over 9 lbs: Yes  Previously had Gestational Diabetes: No  Have you ever had thyroid problems or taken thyroid medication?: No  Heart disease, mitral valve prolapse or rheumatic fever?: Unknown  Hypertension : No  High Cholesterol: No  High Triglycerides: No  Do you use tobacco products?: No  Do you drink beer, wine or hard liquor?: No    Cultural Influences/Ethnic Background:   or       LMP 04/26/2024 (Exact Date)     Weight gain 39 lbs at 36.5 weeks gestation.    Estimated Date of Delivery: Jan 31, 2025    Blood Glucose/Ketone Log:   Date Ketones Fasting Post Breakfast Post Lunch Pre/Post Supper   12/24 neg 95 - - 113/93   12/25 neg 99 113 - 113/93   12/26 neg 90 150 129 95   12/27 neg 83 122* 91 101   12/28 neg 96 138 113* 117   12/29 neg 97 137 127 118   12/30 neg 97 107 - 90/125   12/31 neg 97 107 105 117   1/1 neg 87 88 135 126   1/2 neg 101 85 146 113   1/3 neg 85 114 - 81/119*   1/4 neg  107 117 85 119    neg 107 124 118 86    neg 85 124         Healthy Eating:  Pre-pregnancy weight (lbs): 155  Exercise:: Yes  Days per week of moderate to strenuous exercise (like a brisk walk): 3  On average, minutes per day of exercise at this level: 10  How intense was your typical exercise? : Light (like stretching or slow walking)  Exercise Minutes per Week: 30  Barrier to exercise: Safety, Physical limitation  Cultural/Hinduism diet restrictions?: No  Meal planning/habits: Avoiding sweets, Calorie counting, Heart healthy, Low salt, Smaller portions, Frequent snacking  How many times a week on average do you eat food made away from home (restaurant/take-out)?: 1  Meals include: Breakfast, Lunch, Dinner  Breakfast: wakes:6am:: 2 eggs, avocado, 1 slice wheat toast and 6 oz black coffee. Goes back to sleep after breakfast until 3pm.  Lunch: chicken and sausage gumbo with 1 cup white rice and small glass juice  Dinner: 11pm: chicken wings OR meat, greens, juice, milk  Snacks: PM: veggies - cucumber and cheese  Other: None  Beverages: Water, Coffee, Juice  How many servings of fruits/vegetables per day: 4  Biggest challenges to healthy eating: Other (Small appetite)  Pre- vitamin?: Yes  Supplements?: No  Experiencing nausea?: No  Experiencing heartburn?: Yes    Healthy Coping:  Emotional response to diabetes: Ready to learn  Informal Support system:: Children, Billie based, Family, Friends, Parent, Partner, Shelter, Significant other, Spouse  Stage of change: ACTION (Actively working towards change)    Current Management:  Taking medications for gestational diabetes?: No  Difficulty affording diabetes testing supplies?: No    Lora Back RD  Time Spent: 43 minutes  Encounter Type: Individual     Diabetes medication dose changes were made via the CDCES Standing Orders under the patient's referring provider.

## 2025-01-06 NOTE — PATIENT INSTRUCTIONS
Try to eat some carbohydrates with dinner (fruit OR 1/2 cup cooked rice, noodles, potatoes if you have some milk or juice OR 1 cup of cooked rice, noodles, potatoes if you drink water).    2. Ok to reduce ketone checking to 1 time a week.     3. Try to avoid food if possible after your dinner meal (true fast is no food for 6-8 hours), but if hungry at 3am, instead of fruit, try to limit snack to a few nuts, cheese, egg, or greens. If you are eating fruit at 3am and then checking blood glucose at 6am, may be seeing higher fasting blood glucose from the fruit.    FOLLOW UP: Send us your blood glucose by [facility] on Thursday to see if this is helping.    NEXT APPOINTMENT: Video visit follow up on Monday, January 13th at 7:30am    Lora Back RDN, LD, [facility]   [phone]    [facility]  Diabetes Education    What to do after you have delivered your baby . . .    1-2 Weeks    Resume monitoring your blood glucose:      Fasting; in the morning and before breakfast: Goal:  mg/dl      Two hours after you start any meal: Goal: 140 or less      Perform 4 tests each week until you see your doctor again.      6 Weeks    Attend your appointment with your OB/GYN physician. Ask that a glucose test be taken.    Show the physician your home blood glucose testing record.      Yearly    Have a fasting blood glucose drawn at your yearly physical.      Looking Ahead      Women with a history of gestational diabetes, although it usually resolves after delivery, have a 25 - 60% greater risk of later developing Type 2 diabetes.      The best way to avoid diabetes: reach and maintain a healthy body weight and be physically active. Achieving the right body weight for you will reduce your    chance of developing Type 2 diabetes to 25%.      Be alert to the signs and symptoms of diabetes, seeking medical care for prompt diagnosis and treatment.      Considering another pregnancy?      Women who have had gestational diabetes will  likely develop it again. We recommend that you verify normal blood glucose with your physician before getting pregnant.      Each pregnancy with gestational diabetes increases your risk of developing Type 2 diabetes sooner.      If you do become pregnant, get a prescription from your physician for testing supplies and begin testing 1-2 times a week.      Alert your physician if your results are higher than the goals set in your previous pregnancy.      Have a glucose screening test done at your first OB/GYN visit.    Reducing Risk for Diabetes:  How can I reduce my risk of getting diabetes?   Follow the steps below. These can reduce your risk for Type 2 diabetes by up to 60 percent:  1. Exercise 30 minutes a day, at least five times per week (or 150 minutes of exercise per week).  2. Lose weight if you need to. If you are overweight, losing just 5 to 7 percent of your body weight (an average of 15 pounds) may reduce your risk.   3. Cut back on the carbohydrates, fat and/or calories in your diet.    You should also see your doctor every year to check for diabetes.

## 2025-01-09 ENCOUNTER — PRENATAL OFFICE VISIT (OUTPATIENT)
Dept: OBGYN | Facility: CLINIC | Age: 35
End: 2025-01-09
Payer: COMMERCIAL

## 2025-01-09 VITALS — SYSTOLIC BLOOD PRESSURE: 124 MMHG | WEIGHT: 191 LBS | BODY MASS INDEX: 36.09 KG/M2 | DIASTOLIC BLOOD PRESSURE: 78 MMHG

## 2025-01-09 DIAGNOSIS — O09.93 PREGNANCY, SUPERVISION, HIGH-RISK, THIRD TRIMESTER: Primary | ICD-10-CM

## 2025-01-09 DIAGNOSIS — O24.419 GESTATIONAL DIABETES MELLITUS (GDM) IN THIRD TRIMESTER, GESTATIONAL DIABETES METHOD OF CONTROL UNSPECIFIED: ICD-10-CM

## 2025-01-09 NOTE — PROGRESS NOTES
34-year-old -0-2-3 at 36 weeks 6 days.  Pregnancy complicated by GDM A1 with good control.  Baby is active.  History of rapid labors so we discussed induction at 39 weeks.  GBS negative last visit.  Return to clinic 1 week if undelivered

## 2025-01-09 NOTE — NURSING NOTE
"Chief Complaint   Patient presents with    Prenatal Care   36w6d    initial /78   Wt 86.6 kg (191 lb)   LMP 04/26/2024 (Exact Date)   BMI 36.09 kg/m   Estimated body mass index is 36.09 kg/m  as calculated from the following:    Height as of 2/2/21: 1.549 m (5' 1\").    Weight as of this encounter: 86.6 kg (191 lb).  BP completed using cuff size jose Huber CMA on 1/9/2025 at 1:55 PM    "

## 2025-01-13 ENCOUNTER — VIRTUAL VISIT (OUTPATIENT)
Dept: EDUCATION SERVICES | Facility: CLINIC | Age: 35
End: 2025-01-13
Payer: COMMERCIAL

## 2025-01-13 DIAGNOSIS — O24.419 GESTATIONAL DIABETES MELLITUS (GDM) IN THIRD TRIMESTER, GESTATIONAL DIABETES METHOD OF CONTROL UNSPECIFIED: Primary | ICD-10-CM

## 2025-01-13 PROCEDURE — 99207 PR NO BILLABLE SERVICE THIS VISIT: CPT | Mod: 95

## 2025-01-13 NOTE — PROGRESS NOTES
Diabetes and Pregnancy Follow-up  Type of Service: Video Visit/ 15 minutes     Originating Location (Patient Location): Home  [place] [facility] [place] of Communication:  Video     Video Visit Start Time: 7:36 AM  Video Visit End Time: 7:51 AM     How would patient like to obtain AVS? [place]      Subjective/Objective:    Andressa Patterson was called for a scheduled BG review. Last date of communication was: 1/10/25.    Gestational diabetes is being managed with diet and activity    Taking diabetes medications: no    Estimated Date of Delivery: Jan 31, 2025    Blood Glucose/Ketone Log:    Date Ketones Fasting Post Breakfast Post Lunch Post Supper   1/10 negative 87 105 119 124   1/11 negative 87 118 127 124   1/12 negative 94 108 117 124   (Testing 1 hour post meals)    Assessment:  Ketones: negative x3.   Fasting blood glucoses: 100% in target.  After breakfast: 100% in target.  Before lunch: n/a% in target.  After lunch: 100% in target.  Before dinner: n/a% in target.  After dinner: 100% in target.    Patient with no questions/concerns.  Discussed labor, delivery, and post-partum.    Plan/Response:  Continue to test glucose 4 times per day:   Fasting (when you first awake for the day): 95 mg/dL or below   1 hour after breakfast: 140 mg/dL or below   1 hour after lunch: 140 mg/dL or below   1 hour after dinner: 140 mg/dL or below     Please bring your meter and log book to all appointments     If you miss a 1 hour after a meal test, test 2 hours after the meal.  Goal 2 hours after is 120 mg/dL or below.     2.  Check your urine ketones once a week, when you first awake for the day.  Goal is negative to trace.    3.  Meal Plan    Breakfast: 30 grams carbohydrate + protein   Snack: 15-30 grams carbohydrate + protein  Lunch: 45-60 grams carbohydrate + protein  Snack: 15-30 grams carbohydrate + protein  Dinner: 45-60 grams carbohydrate + protein  Snack: 15-30 grams carbohydrate +  protein    Remember you should consume some carbohydrates every 2-3 hours while awake and you need a minimum of 175 grams of carbohydrate per day.    4.  Aim for 20-30 minutes of activity most days of the week (with the okay of your OB provider)      5.  After delivery, check your glucose starting in the second week postpartum.  Test 4 times per week.  Twice fasting and twice 2 hours after a meal.    Fasting goal is 100 mg/dL or below   2 hours after a meal goal is 140 mg/dL or below     Please note these are different goals then when pregnant.   Bring these to your postpartum OB visit.    6.  Be screened for type 2 diabetes at 4-12 weeks postpartum and every 1-3 years there after.     7.  If you are planning future pregnancies, confirm normal glucoses before conceiving.     8.  Call Diabetes Education at [phone] or send a [employer] message with:   -questions or concerns   -weekly with an update on blood sugars & ketones (due 1/20/25)   -ketones that are small, moderate, or large   -3 or more blood sugars above target in a 7 day period    Erica Donovan, MPH, RD, CDCES, LD 1/13/2025    Time Spent: 15 minutes    Any diabetes medication dose changes were made via the CDE Protocol and Collaborative Practice Agreement with the patient's referring provider. A copy of this encounter was shared with the provider.    16

## 2025-01-13 NOTE — PATIENT INSTRUCTIONS
Continue to test glucose 4 times per day:   Fasting (when you first awake for the day): 95 mg/dL or below   1 hour after breakfast: 140 mg/dL or below   1 hour after lunch: 140 mg/dL or below   1 hour after dinner: 140 mg/dL or below     Please bring your meter and log book to all appointments     If you miss a 1 hour after a meal test, test 2 hours after the meal.  Goal 2 hours after is 120 mg/dL or below.     2.  Check your urine ketones once a week, when you first awake for the day.  Goal is negative to trace.    3.  Meal Plan  Breakfast: 30 grams carbohydrate + protein   Snack: 15-30 grams carbohydrate + protein  Lunch: 45-60 grams carbohydrate + protein  Snack: 15-30 grams carbohydrate + protein  Dinner: 45-60 grams carbohydrate + protein  Snack: 15-30 grams carbohydrate + protein    Remember you should consume some carbohydrates every 2-3 hours while awake and you need a minimum of 175 grams of carbohydrate per day.    4.  Aim for 20-30 minutes of activity most days of the week (with the okay of your OB provider)      5.  After delivery, check your glucose starting in the second week postpartum.  Test 4 times per week.  Twice fasting and twice 2 hours after a meal.    Fasting goal is 100 mg/dL or below   2 hours after a meal goal is 140 mg/dL or below     Please note these are different goals then when pregnant.   Bring these to your postpartum OB visit.    6.  Be screened for type 2 diabetes at 4-12 weeks postpartum and every 1-3 years there after.     7.  If you are planning future pregnancies, confirm normal glucoses before conceiving.     8.  Call Diabetes Education at [phone] or send a All-[employer] message with:   -questions or concerns   -weekly with an update on blood sugars & ketones (due 1/20/25)   -ketones that are small, moderate, or large   -3 or more blood sugars above target in a 7 day period    Thank you,     Erica Donovan, MPH, RD, CDCES, LD 1/13/2025

## 2025-01-13 NOTE — LETTER
1/13/2025         RE: Andressa Patterson  [address] [place] [address]        Dear Colleague,    Thank you for referring your patient, Andressa Patterson, to the [facility]. Please see a copy of my visit note below.    Diabetes and Pregnancy Follow-up  Type of Service: Video Visit/ 15 minutes     Originating Location (Patient Location): Home  Distant Location (Provider Location): [facility]  Mode of Communication:  Video     Video Visit Start Time: 7:36 AM  Video Visit End Time: 7:51 AM     How would patient like to obtain AVS? MyChart      Subjective/Objective:    Andressa Patterson was called for a scheduled BG review. Last date of communication was: 1/10/25.    Gestational diabetes is being managed with diet and activity    Taking diabetes medications: no    Estimated Date of Delivery: Jan 31, 2025    Blood Glucose/Ketone Log:    Date Ketones Fasting Post Breakfast Post Lunch Post Supper   1/10 negative 87 105 119 124   1/11 negative 87 118 127 124   1/12 negative 94 108 117 124   (Testing 1 hour post meals)    Assessment:  Ketones: negative x3.   Fasting blood glucoses: 100% in target.  After breakfast: 100% in target.  Before lunch: n/a% in target.  After lunch: 100% in target.  Before dinner: n/a% in target.  After dinner: 100% in target.    Patient with no questions/concerns.  Discussed labor, delivery, and post-partum.    Plan/Response:  Continue to test glucose 4 times per day:   Fasting (when you first awake for the day): 95 mg/dL or below   1 hour after breakfast: 140 mg/dL or below   1 hour after lunch: 140 mg/dL or below   1 hour after dinner: 140 mg/dL or below     Please bring your meter and log book to all appointments     If you miss a 1 hour after a meal test, test 2 hours after the meal.  Goal 2 hours after is 120 mg/dL or below.     2.  Check your urine ketones once a week, when you first awake for the day.  Goal is negative to trace.    3.  Meal Plan    Breakfast: 30 grams  carbohydrate + protein   Snack: 15-30 grams carbohydrate + protein  Lunch: 45-60 grams carbohydrate + protein  Snack: 15-30 grams carbohydrate + protein  Dinner: 45-60 grams carbohydrate + protein  Snack: 15-30 grams carbohydrate + protein    Remember you should consume some carbohydrates every 2-3 hours while awake and you need a minimum of 175 grams of carbohydrate per day.    4.  Aim for 20-30 minutes of activity most days of the week (with the okay of your OB provider)      5.  After delivery, check your glucose starting in the second week postpartum.  Test 4 times per week.  Twice fasting and twice 2 hours after a meal.    Fasting goal is 100 mg/dL or below   2 hours after a meal goal is 140 mg/dL or below     Please note these are different goals then when pregnant.   Bring these to your postpartum OB visit.    6.  Be screened for type 2 diabetes at 4-12 weeks postpartum and every 1-3 years there after.     7.  If you are planning future pregnancies, confirm normal glucoses before conceiving.     8.  Call Diabetes Education at [phone] or send a enrich-in message with:   -questions or concerns   -weekly with an update on blood sugars & ketones (due 1/20/25)   -ketones that are small, moderate, or large   -3 or more blood sugars above target in a 7 day period    Erica Donovan, MPH, RD, CDCES, LD 1/13/2025    Time Spent: 15 minutes    Any diabetes medication dose changes were made via the CDE Protocol and Collaborative Practice Agreement with the patient's referring provider. A copy of this encounter was shared with the provider.

## 2025-01-16 ENCOUNTER — PRENATAL OFFICE VISIT (OUTPATIENT)
Dept: OBGYN | Facility: CLINIC | Age: 35
End: 2025-01-16
Payer: COMMERCIAL

## 2025-01-16 ENCOUNTER — HOSPITAL ENCOUNTER (INPATIENT)
Facility: CLINIC | Age: 35
LOS: 1 days | Discharge: HOME HEALTH | End: 2025-01-17
Attending: OBSTETRICS & GYNECOLOGY | Admitting: OBSTETRICS & GYNECOLOGY
Payer: COMMERCIAL

## 2025-01-16 VITALS — BODY MASS INDEX: 36.66 KG/M2 | SYSTOLIC BLOOD PRESSURE: 104 MMHG | WEIGHT: 194 LBS | DIASTOLIC BLOOD PRESSURE: 76 MMHG

## 2025-01-16 DIAGNOSIS — O24.419 GESTATIONAL DIABETES MELLITUS (GDM) IN THIRD TRIMESTER, GESTATIONAL DIABETES METHOD OF CONTROL UNSPECIFIED: ICD-10-CM

## 2025-01-16 DIAGNOSIS — O09.93 PREGNANCY, SUPERVISION, HIGH-RISK, THIRD TRIMESTER: Primary | ICD-10-CM

## 2025-01-16 LAB
ABO + RH BLD: NORMAL
BLD GP AB SCN SERPL QL: NEGATIVE
GLUCOSE BLDC GLUCOMTR-MCNC: 96 MG/DL (ref 70–99)
HGB BLD-MCNC: 13 G/DL (ref 11.7–15.7)
SPECIMEN EXP DATE BLD: NORMAL

## 2025-01-16 PROCEDURE — 10907ZC DRAINAGE OF AMNIOTIC FLUID, THERAPEUTIC FROM PRODUCTS OF CONCEPTION, VIA NATURAL OR ARTIFICIAL OPENING: ICD-10-PCS | Performed by: OBSTETRICS & GYNECOLOGY

## 2025-01-16 PROCEDURE — 250N000011 HC RX IP 250 OP 636: Mod: JW | Performed by: OBSTETRICS & GYNECOLOGY

## 2025-01-16 PROCEDURE — 86901 BLOOD TYPING SEROLOGIC RH(D): CPT | Performed by: OBSTETRICS & GYNECOLOGY

## 2025-01-16 PROCEDURE — 722N000001 HC LABOR CARE VAGINAL DELIVERY SINGLE

## 2025-01-16 PROCEDURE — 258N000003 HC RX IP 258 OP 636: Performed by: OBSTETRICS & GYNECOLOGY

## 2025-01-16 PROCEDURE — 86780 TREPONEMA PALLIDUM: CPT | Performed by: OBSTETRICS & GYNECOLOGY

## 2025-01-16 PROCEDURE — 62273 INJECT EPIDURAL PATCH: CPT | Performed by: ANESTHESIOLOGY

## 2025-01-16 PROCEDURE — 120N000001 HC R&B MED SURG/OB

## 2025-01-16 PROCEDURE — 85018 HEMOGLOBIN: CPT | Performed by: OBSTETRICS & GYNECOLOGY

## 2025-01-16 PROCEDURE — 250N000009 HC RX 250: Performed by: OBSTETRICS & GYNECOLOGY

## 2025-01-16 PROCEDURE — 250N000013 HC RX MED GY IP 250 OP 250 PS 637: Performed by: OBSTETRICS & GYNECOLOGY

## 2025-01-16 PROCEDURE — 59400 OBSTETRICAL CARE: CPT | Performed by: OBSTETRICS & GYNECOLOGY

## 2025-01-16 PROCEDURE — 86900 BLOOD TYPING SEROLOGIC ABO: CPT | Performed by: OBSTETRICS & GYNECOLOGY

## 2025-01-16 RX ORDER — KETOROLAC TROMETHAMINE 30 MG/ML
30 INJECTION, SOLUTION INTRAMUSCULAR; INTRAVENOUS
Status: DISCONTINUED | OUTPATIENT
Start: 2025-01-16 | End: 2025-01-16

## 2025-01-16 RX ORDER — IBUPROFEN 800 MG/1
800 TABLET, FILM COATED ORAL EVERY 6 HOURS PRN
Status: DISCONTINUED | OUTPATIENT
Start: 2025-01-16 | End: 2025-01-18 | Stop reason: HOSPADM

## 2025-01-16 RX ORDER — TRANEXAMIC ACID 10 MG/ML
1 INJECTION, SOLUTION INTRAVENOUS EVERY 30 MIN PRN
Status: DISCONTINUED | OUTPATIENT
Start: 2025-01-16 | End: 2025-01-18 | Stop reason: HOSPADM

## 2025-01-16 RX ORDER — OXYTOCIN 10 [USP'U]/ML
10 INJECTION, SOLUTION INTRAMUSCULAR; INTRAVENOUS
Status: DISCONTINUED | OUTPATIENT
Start: 2025-01-16 | End: 2025-01-18 | Stop reason: HOSPADM

## 2025-01-16 RX ORDER — MISOPROSTOL 200 UG/1
800 TABLET ORAL
Status: DISCONTINUED | OUTPATIENT
Start: 2025-01-16 | End: 2025-01-16

## 2025-01-16 RX ORDER — FENTANYL CITRATE-0.9 % NACL/PF 10 MCG/ML
100 PLASTIC BAG, INJECTION (ML) INTRAVENOUS EVERY 5 MIN PRN
Status: DISCONTINUED | OUTPATIENT
Start: 2025-01-16 | End: 2025-01-16

## 2025-01-16 RX ORDER — CITRIC ACID/SODIUM CITRATE 334-500MG
30 SOLUTION, ORAL ORAL
Status: DISCONTINUED | OUTPATIENT
Start: 2025-01-16 | End: 2025-01-16

## 2025-01-16 RX ORDER — KETOROLAC TROMETHAMINE 30 MG/ML
30 INJECTION, SOLUTION INTRAMUSCULAR; INTRAVENOUS ONCE
Status: COMPLETED | OUTPATIENT
Start: 2025-01-16 | End: 2025-01-16

## 2025-01-16 RX ORDER — NALOXONE HYDROCHLORIDE 0.4 MG/ML
0.2 INJECTION, SOLUTION INTRAMUSCULAR; INTRAVENOUS; SUBCUTANEOUS
Status: DISCONTINUED | OUTPATIENT
Start: 2025-01-16 | End: 2025-01-16

## 2025-01-16 RX ORDER — CALCIUM CARBONATE 500 MG/1
1000 TABLET, CHEWABLE ORAL 3 TIMES DAILY PRN
Status: DISCONTINUED | OUTPATIENT
Start: 2025-01-16 | End: 2025-01-16

## 2025-01-16 RX ORDER — MODIFIED LANOLIN
OINTMENT (GRAM) TOPICAL
Status: DISCONTINUED | OUTPATIENT
Start: 2025-01-16 | End: 2025-01-18 | Stop reason: HOSPADM

## 2025-01-16 RX ORDER — METHYLERGONOVINE MALEATE 0.2 MG/ML
200 INJECTION INTRAVENOUS
Status: DISCONTINUED | OUTPATIENT
Start: 2025-01-16 | End: 2025-01-16

## 2025-01-16 RX ORDER — LOPERAMIDE HYDROCHLORIDE 2 MG/1
2 CAPSULE ORAL
Status: DISCONTINUED | OUTPATIENT
Start: 2025-01-16 | End: 2025-01-16

## 2025-01-16 RX ORDER — MISOPROSTOL 200 UG/1
400 TABLET ORAL
Status: DISCONTINUED | OUTPATIENT
Start: 2025-01-16 | End: 2025-01-16

## 2025-01-16 RX ORDER — OXYTOCIN/0.9 % SODIUM CHLORIDE 30/500 ML
340 PLASTIC BAG, INJECTION (ML) INTRAVENOUS CONTINUOUS PRN
Status: DISCONTINUED | OUTPATIENT
Start: 2025-01-16 | End: 2025-01-18 | Stop reason: HOSPADM

## 2025-01-16 RX ORDER — FENTANYL/BUPIVACAINE/NS/PF 2-1250MCG
PLASTIC BAG, INJECTION (ML) INJECTION
Status: COMPLETED
Start: 2025-01-16 | End: 2025-01-16

## 2025-01-16 RX ORDER — NALOXONE HYDROCHLORIDE 0.4 MG/ML
0.4 INJECTION, SOLUTION INTRAMUSCULAR; INTRAVENOUS; SUBCUTANEOUS
Status: DISCONTINUED | OUTPATIENT
Start: 2025-01-16 | End: 2025-01-16

## 2025-01-16 RX ORDER — NALBUPHINE HYDROCHLORIDE 10 MG/ML
2.5-5 INJECTION INTRAMUSCULAR; INTRAVENOUS; SUBCUTANEOUS EVERY 6 HOURS PRN
Status: DISCONTINUED | OUTPATIENT
Start: 2025-01-16 | End: 2025-01-16

## 2025-01-16 RX ORDER — LOPERAMIDE HYDROCHLORIDE 2 MG/1
2 CAPSULE ORAL
Status: DISCONTINUED | OUTPATIENT
Start: 2025-01-16 | End: 2025-01-18 | Stop reason: HOSPADM

## 2025-01-16 RX ORDER — ONDANSETRON 2 MG/ML
4 INJECTION INTRAMUSCULAR; INTRAVENOUS EVERY 6 HOURS PRN
Status: DISCONTINUED | OUTPATIENT
Start: 2025-01-16 | End: 2025-01-16

## 2025-01-16 RX ORDER — LOPERAMIDE HYDROCHLORIDE 2 MG/1
4 CAPSULE ORAL
Status: DISCONTINUED | OUTPATIENT
Start: 2025-01-16 | End: 2025-01-16

## 2025-01-16 RX ORDER — MISOPROSTOL 200 UG/1
800 TABLET ORAL
Status: DISCONTINUED | OUTPATIENT
Start: 2025-01-16 | End: 2025-01-18 | Stop reason: HOSPADM

## 2025-01-16 RX ORDER — IBUPROFEN 800 MG/1
800 TABLET, FILM COATED ORAL
Status: DISCONTINUED | OUTPATIENT
Start: 2025-01-16 | End: 2025-01-16

## 2025-01-16 RX ORDER — MISOPROSTOL 200 UG/1
400 TABLET ORAL
Status: DISCONTINUED | OUTPATIENT
Start: 2025-01-16 | End: 2025-01-18 | Stop reason: HOSPADM

## 2025-01-16 RX ORDER — HYDROCORTISONE 25 MG/G
CREAM TOPICAL 3 TIMES DAILY PRN
Status: DISCONTINUED | OUTPATIENT
Start: 2025-01-16 | End: 2025-01-18 | Stop reason: HOSPADM

## 2025-01-16 RX ORDER — ONDANSETRON 4 MG/1
4 TABLET, ORALLY DISINTEGRATING ORAL EVERY 6 HOURS PRN
Status: DISCONTINUED | OUTPATIENT
Start: 2025-01-16 | End: 2025-01-16

## 2025-01-16 RX ORDER — METOCLOPRAMIDE 10 MG/1
10 TABLET ORAL EVERY 6 HOURS PRN
Status: DISCONTINUED | OUTPATIENT
Start: 2025-01-16 | End: 2025-01-16

## 2025-01-16 RX ORDER — LOPERAMIDE HYDROCHLORIDE 2 MG/1
4 CAPSULE ORAL
Status: DISCONTINUED | OUTPATIENT
Start: 2025-01-16 | End: 2025-01-18 | Stop reason: HOSPADM

## 2025-01-16 RX ORDER — CARBOPROST TROMETHAMINE 250 UG/ML
250 INJECTION, SOLUTION INTRAMUSCULAR
Status: DISCONTINUED | OUTPATIENT
Start: 2025-01-16 | End: 2025-01-18 | Stop reason: HOSPADM

## 2025-01-16 RX ORDER — OXYTOCIN 10 [USP'U]/ML
10 INJECTION, SOLUTION INTRAMUSCULAR; INTRAVENOUS
Status: DISCONTINUED | OUTPATIENT
Start: 2025-01-16 | End: 2025-01-16

## 2025-01-16 RX ORDER — PROCHLORPERAZINE MALEATE 10 MG
10 TABLET ORAL EVERY 6 HOURS PRN
Status: DISCONTINUED | OUTPATIENT
Start: 2025-01-16 | End: 2025-01-16

## 2025-01-16 RX ORDER — DOCUSATE SODIUM 100 MG/1
100 CAPSULE, LIQUID FILLED ORAL DAILY
Status: DISCONTINUED | OUTPATIENT
Start: 2025-01-16 | End: 2025-01-18 | Stop reason: HOSPADM

## 2025-01-16 RX ORDER — ACETAMINOPHEN 325 MG/1
650 TABLET ORAL EVERY 4 HOURS PRN
Status: DISCONTINUED | OUTPATIENT
Start: 2025-01-16 | End: 2025-01-18 | Stop reason: HOSPADM

## 2025-01-16 RX ORDER — BISACODYL 10 MG
10 SUPPOSITORY, RECTAL RECTAL DAILY PRN
Status: DISCONTINUED | OUTPATIENT
Start: 2025-01-16 | End: 2025-01-18 | Stop reason: HOSPADM

## 2025-01-16 RX ORDER — METOCLOPRAMIDE HYDROCHLORIDE 5 MG/ML
10 INJECTION INTRAMUSCULAR; INTRAVENOUS EVERY 6 HOURS PRN
Status: DISCONTINUED | OUTPATIENT
Start: 2025-01-16 | End: 2025-01-16

## 2025-01-16 RX ORDER — OXYTOCIN/0.9 % SODIUM CHLORIDE 30/500 ML
340 PLASTIC BAG, INJECTION (ML) INTRAVENOUS CONTINUOUS PRN
Status: DISCONTINUED | OUTPATIENT
Start: 2025-01-16 | End: 2025-01-16

## 2025-01-16 RX ORDER — TRANEXAMIC ACID 10 MG/ML
1 INJECTION, SOLUTION INTRAVENOUS EVERY 30 MIN PRN
Status: DISCONTINUED | OUTPATIENT
Start: 2025-01-16 | End: 2025-01-16

## 2025-01-16 RX ORDER — OXYTOCIN/0.9 % SODIUM CHLORIDE 30/500 ML
100-340 PLASTIC BAG, INJECTION (ML) INTRAVENOUS CONTINUOUS PRN
Status: DISCONTINUED | OUTPATIENT
Start: 2025-01-16 | End: 2025-01-16

## 2025-01-16 RX ORDER — METHYLERGONOVINE MALEATE 0.2 MG/ML
200 INJECTION INTRAVENOUS
Status: DISCONTINUED | OUTPATIENT
Start: 2025-01-16 | End: 2025-01-18 | Stop reason: HOSPADM

## 2025-01-16 RX ORDER — CARBOPROST TROMETHAMINE 250 UG/ML
250 INJECTION, SOLUTION INTRAMUSCULAR
Status: DISCONTINUED | OUTPATIENT
Start: 2025-01-16 | End: 2025-01-16

## 2025-01-16 RX ADMIN — Medication: at 17:07

## 2025-01-16 RX ADMIN — SODIUM CHLORIDE, POTASSIUM CHLORIDE, SODIUM LACTATE AND CALCIUM CHLORIDE 500 ML: 600; 310; 30; 20 INJECTION, SOLUTION INTRAVENOUS at 17:09

## 2025-01-16 RX ADMIN — ACETAMINOPHEN 650 MG: 325 TABLET, FILM COATED ORAL at 23:33

## 2025-01-16 RX ADMIN — CALCIUM CARBONATE (ANTACID) CHEW TAB 500 MG 1000 MG: 500 CHEW TAB at 19:49

## 2025-01-16 RX ADMIN — Medication 340 ML/HR: at 20:17

## 2025-01-16 RX ADMIN — KETOROLAC TROMETHAMINE 30 MG: 30 INJECTION, SOLUTION INTRAMUSCULAR at 20:59

## 2025-01-16 ASSESSMENT — ACTIVITIES OF DAILY LIVING (ADL)
ADLS_ACUITY_SCORE: 46
ADLS_ACUITY_SCORE: 20

## 2025-01-16 NOTE — PROGRESS NOTES
34-year-old  at 37 weeks 6 days.  Patient with well-controlled GDM A1.  Cervix very favorable.  L&D discussed.  Return to clinic 1 week with induction at 39 weeks due to GDM if undelivered

## 2025-01-16 NOTE — PROVIDER NOTIFICATION
01/16/25 1637   Provider Notification   Provider Name/Title Dr. Guevara   Method of Notification At Bedside     MD at bedside. Pt is getting ready for epidural. Will update MD when pt is comfortable.

## 2025-01-16 NOTE — CARE PLAN
Data: Patient presented to [facility]: 2025  4:19 PM.  Reason for maternal/fetal assessment is uterine contractions. Patient reports that she is having contractions that are about 4 minutes apart. Pt is breathing heavily through them and was 4 cm in the clinic today.  Patient is a .  Prenatal record reviewed. Pregnancy  has been complicated by gestational diabetes, diet controlled.  Gestational Age 37w6d. VSS. Fetal movement active. Patient denies leaking of vaginal fluid/rupture of membranes, vaginal bleeding, pelvic pressure, nausea, vomiting, headache, visual disturbances, epigastric or URQ pain, significant edema. Support person is present.   Action: Verbal consent for EFM. Triage assessment completed. Bill of rights reviewed.  Response: Patient verbalized agreement with plan. Will contact Dr Curly Guevara with update and for further orders.

## 2025-01-16 NOTE — NURSING NOTE
"Chief Complaint   Patient presents with    Prenatal Care   37w6d    initial /76   Wt 88 kg (194 lb)   LMP 04/26/2024 (Exact Date)   BMI 36.66 kg/m   Estimated body mass index is 36.66 kg/m  as calculated from the following:    Height as of 2/2/21: 1.549 m (5' 1\").    Weight as of this encounter: 88 kg (194 lb).  BP completed using cuff size jose Huber CMA on 1/16/2025 at 1:40 PM    "

## 2025-01-16 NOTE — H&P
No significant change in general health status based on exam of the patient, review of Nursing database and prenatal.   33yo  at 37w6d here with spontaneous labor.  Was 4cm in office today and shortly after visit began having regular ctx.  6cm on arrival to L&D.  FHTs Cat 1  Prepping for epidural per pt request.  Monitor progress

## 2025-01-17 VITALS
BODY MASS INDEX: 36.3 KG/M2 | DIASTOLIC BLOOD PRESSURE: 87 MMHG | SYSTOLIC BLOOD PRESSURE: 127 MMHG | TEMPERATURE: 98.1 F | RESPIRATION RATE: 16 BRPM | HEART RATE: 78 BPM | WEIGHT: 192.1 LBS

## 2025-01-17 PROBLEM — Z36.89 ENCOUNTER FOR TRIAGE IN PREGNANT PATIENT: Status: RESOLVED | Noted: 2021-02-01 | Resolved: 2025-01-17

## 2025-01-17 PROBLEM — O00.90 ECTOPIC PREGNANCY: Status: RESOLVED | Noted: 2020-02-06 | Resolved: 2025-01-17

## 2025-01-17 LAB — T PALLIDUM AB SER QL: NONREACTIVE

## 2025-01-17 PROCEDURE — 250N000013 HC RX MED GY IP 250 OP 250 PS 637: Performed by: OBSTETRICS & GYNECOLOGY

## 2025-01-17 RX ORDER — IBUPROFEN 200 MG
600 TABLET ORAL EVERY 6 HOURS PRN
COMMUNITY
Start: 2025-01-17

## 2025-01-17 RX ADMIN — ACETAMINOPHEN 650 MG: 325 TABLET, FILM COATED ORAL at 11:50

## 2025-01-17 RX ADMIN — IBUPROFEN 800 MG: 800 TABLET, FILM COATED ORAL at 03:22

## 2025-01-17 RX ADMIN — IBUPROFEN 800 MG: 800 TABLET, FILM COATED ORAL at 15:57

## 2025-01-17 RX ADMIN — IBUPROFEN 800 MG: 800 TABLET, FILM COATED ORAL at 09:43

## 2025-01-17 RX ADMIN — ACETAMINOPHEN 650 MG: 325 TABLET, FILM COATED ORAL at 03:22

## 2025-01-17 RX ADMIN — ACETAMINOPHEN 650 MG: 325 TABLET, FILM COATED ORAL at 07:39

## 2025-01-17 RX ADMIN — ACETAMINOPHEN 650 MG: 325 TABLET, FILM COATED ORAL at 15:56

## 2025-01-17 RX ADMIN — DOCUSATE SODIUM 100 MG: 100 CAPSULE, LIQUID FILLED ORAL at 07:39

## 2025-01-17 RX ADMIN — ACETAMINOPHEN 650 MG: 325 TABLET, FILM COATED ORAL at 20:19

## 2025-01-17 ASSESSMENT — ACTIVITIES OF DAILY LIVING (ADL)
ADLS_ACUITY_SCORE: 20
ADLS_ACUITY_SCORE: 20
ADLS_ACUITY_SCORE: 22
ADLS_ACUITY_SCORE: 23
ADLS_ACUITY_SCORE: 20
ADLS_ACUITY_SCORE: 22
ADLS_ACUITY_SCORE: 20
ADLS_ACUITY_SCORE: 22
ADLS_ACUITY_SCORE: 20
ADLS_ACUITY_SCORE: 22

## 2025-01-17 NOTE — PROGRESS NOTES
[facility] OB Postpartum Note    S:  Patient without complaints.  Minimal lochia.    O:  Blood pressure 128/79, pulse 79, temperature 98.2  F (36.8  C), temperature source Oral, resp. rate 14, last menstrual period 04/26/2024, unknown if currently breastfeeding.        Urine output adequate        Abdomen - Fundus firm, at umbilicus, nontender        Extremities - No calf tenderness    Labs:  Recent Labs   Lab 01/16/25  1914   GLC 96       A:   Postpartum Day# 1, s/p Vaginal delivery         GDM A1 - BS's WNL      P:  1)  Routine care        2)  Probable D/C tomorrow      Cristo Liu MD

## 2025-01-17 NOTE — PROVIDER NOTIFICATION
01/16/25 2040   Provider Notification   Provider Name/Title Dr. Guevara   Method of Notification In Department     MD notified of /90 post delivery. Off going RN reported a few 90's diastolic throughout labor as well. MD aware, no new orders.

## 2025-01-17 NOTE — PROVIDER NOTIFICATION
01/16/25 1746   Provider Notification   Provider Name/Title Dr. Guevara   Method of Notification At Bedside     Orders received from MD to place hernandez catheter.

## 2025-01-17 NOTE — PROVIDER NOTIFICATION
01/16/25 1700   Provider Notification   Provider Name/Title Dr. Guevara   Method of Notification At Bedside     MD aware of pt high BPs. Will call MD if needed.

## 2025-01-17 NOTE — LACTATION NOTE
This note was copied from a baby's chart.  Lactation visit; Leon is Andressa's 4th baby- reports breastfeeding her other children successfully.  Maternal GDM and Leon on hypoglycemic protocol- first three blood sugars WNL.  When writer came to room Leon breastfeeding on left side and active- mother reports second side and has been breastfeeding for about 10 minutes on that side. Leon observed to have fairly wide mouth and flanged lips- coordinated suck pattern. Reviewed utilizing breast compressions as needed to help with swallows. Infant fell asleep and came off breast. Education reinforced on deep latch and positioning techniques- mother demonstrates correct positioning.   Highly encouraged hand expressing and offering EBM d/t being on hypoglycemic protocol. Reviewed how to cup or syringe feed back to infant.  Education provided on benefits of STS. Discussed expected feeding behaviors in first 24 hours vs post 24 hours.   All questions answered and suggested following up with lactation tomorrow.

## 2025-01-17 NOTE — PROVIDER NOTIFICATION
01/16/25 2000   Provider Notification   Provider Name/Title Dr. Guevara   Method of Notification Electronic Page     MD paged to come for delivery.

## 2025-01-17 NOTE — PROGRESS NOTES
Public Health Nurse (PHN) in to see patient to discuss [facility] ([facility]) programs. Patient is not interested in referral for a nurse visit at this time but will reach out to [facility] if interested in scheduling a nurse visit. PHN discussed [facility] community resource guide and rack cards and left these resources with patient.

## 2025-01-17 NOTE — PLAN OF CARE
Assumed cares at 2300. Oriented to room. VSS. Up ad ana. Voiding without difficulty. Lochia scant, no clots. Fundus firm and midline. Pain well managed with Tylenol and Ibuprofen. Breastfeeding, tolerating well. Bonding well with infant.    Goal Outcome Evaluation:      Plan of Care Reviewed With: patient    Overall Patient Progress: improvingOverall Patient Progress: improving    Problem: Adult Inpatient Plan of Care  Goal: Plan of Care Review  Description: The Plan of Care Review/Shift note should be completed every shift.  The Outcome Evaluation is a brief statement about your assessment that the patient is improving, declining, or no change.  This information will be displayed automatically on your shift  note.  Outcome: Progressing  Flowsheets (Taken 1/17/2025 0410)  Plan of Care Reviewed With: patient  Overall Patient Progress: improving  Goal: Absence of Hospital-Acquired Illness or Injury  Intervention: Prevent Skin Injury  Recent Flowsheet Documentation  Taken 1/16/2025 2323 by Mely Cross RN  Body Position: position changed independently  Intervention: Prevent Infection  Recent Flowsheet Documentation  Taken 1/16/2025 2323 by Mely Cross RN  Infection Prevention:   rest/sleep promoted   hand hygiene promoted   environmental surveillance performed  Goal: Optimal Comfort and Wellbeing  Intervention: Monitor Pain and Promote Comfort  Recent Flowsheet Documentation  Taken 1/16/2025 2333 by Mely Cross RN  Pain Management Interventions: medication (see MAR)  Intervention: Provide Person-Centered Care  Recent Flowsheet Documentation  Taken 1/16/2025 2323 by Mely Cross RN  Trust Relationship/Rapport:   care explained   choices provided   emotional support provided   empathic listening provided   questions answered   questions encouraged   reassurance provided   thoughts/feelings acknowledged     Problem: Labor  Goal: Stable Fetal Wellbeing  Intervention: Promote and  Monitor Fetal Wellbeing  Recent Flowsheet Documentation  Taken 1/16/2025 2323 by Mely Cross RN  Body Position: position changed independently  Goal: Absence of Infection Signs and Symptoms  Intervention: Prevent or Manage Infection  Recent Flowsheet Documentation  Taken 1/16/2025 2323 by Mely Cross RN  Infection Prevention:   rest/sleep promoted   hand hygiene promoted   environmental surveillance performed     Problem: Postpartum (Vaginal Delivery)  Goal: Successful Parent Role Transition  Intervention: Support Parent Role Transition  Recent Flowsheet Documentation  Taken 1/16/2025 2323 by Mely Cross, RN  Supportive Measures:   active listening utilized   decision-making supported   goal-setting facilitated   positive reinforcement provided   problem-solving facilitated   relaxation techniques promoted   self-care encouraged   verbalization of feelings encouraged  Parent-Child Attachment Promotion:   caring behavior modeled   cue recognition promoted   face-to-face positioning promoted   interaction encouraged   parent/caregiver presence encouraged   participation in care promoted   positive reinforcement provided   rooming-in promoted   skin-to-skin contact encouraged   strengths emphasized  Goal: Optimal Pain Control and Function  Intervention: Prevent or Manage Pain  Recent Flowsheet Documentation  Taken 1/16/2025 2333 by Mely Cross, RN  Pain Management Interventions: medication (see MAR)

## 2025-01-17 NOTE — PROVIDER NOTIFICATION
01/16/25 1745   Provider Notification   Provider Name/Title Dr. Guevara   Method of Notification At Bedside     MD at bedside. Discussed AROM with pt. Pt agreeable to this plan. AROM @1743 with small amount of clear fluid. SVE per MD unchanged.

## 2025-01-17 NOTE — L&D DELIVERY NOTE
OB Vaginal Delivery Note    Anderssa Patterson MRN# [id number]   Age: 34 year old YOB: 1990     Spontaneous labor.  Well controlled GDMA1  GA: 37w6d  GP:   Labor Complications: None  EBL:   mL  Delivery QBL: 15 mL  Delivery Type: Vaginal, Spontaneous  ROM to Delivery Time: (Delivered) Hours: 2 Minutes: 28   Weight:     1 Minute 5 Minute 10 Minute   Apgar Totals: 8   9        AUNDREA BUTLER;EILEEN JULES    Delivery Details:  Andressa Patterson, a 34 year old  female delivered a viable infant with apgars of 8  and 9 . Patient was fully dilated and pushing after   hours   minutes in active labor. Delivery was via vaginal, spontaneous to a sterile field under epidural anesthesia. Infant delivered in vertex right occiput anterior position. Anterior and posterior shoulders delivered without difficulty. The cord was clamped, cut twice and   were noted. Cord blood was obtained in routine fashion with the following disposition: discard.      Cord complications: nuchal  Placenta delivered at 2025  8:17 PM. Placental disposition was Hospital disposal. Fundal massage performed and fundus found to be firm.     Episiotomy: none   Perineum, vagina, cervix were inspected, and the following lacerations were noted:   Perineal lacerations: none               Any lacerations were repaired in the usual fashion using N/A.    Excellent hemostasis was noted. Needle count correct. Infant and patient in delivery room in good and stable condition.        Lisa Patterson-Andressa [[id number]]      Labor Event Times      Latent labor onset date/time: 2025 1500    Active labor onset date: 25 Onset time:  4:30 PM          Labor Length      3rd Stage (hrs): 0 (min): 2          Labor Events     labor?: No   steroids: None  Labor Type: Spontaneous     Antibiotics received during labor?: No       Rupture date/time: 25 1746   Rupture type: Artificial Rupture of Membranes  Fluid  color: Clear  Fluid odor: Normal     Augmentation: AROM  Indications for augmentation: Ineffective Contraction Pattern       Delivery/Placenta Date and Time      Delivery Date: 25 Delivery Time:  8:14 PM   Placenta Date/Time: 2025  8:17 PM  Oxytocin given at the time of delivery: after delivery of placenta  Delivering clinician: Curly Guevara MD   Other personnel present at delivery:  Provider Role   Aundrea Urrutia RN Delivery Nurse   Saúl Taylor RN Delivery Assist             Vaginal Counts       Initial count performed by 2 team members:  Two Team Members   Dr. Paola INTERIANO RN         [place] Suture Needles Sponges (RETIRED) Instruments   Initial counts 2  5    Added to count       Relief counts       Final counts 2  5            Placed during labor Accounted for at the end of labor   FSE No NA   IUPC No NA   Cervidil No NA                  Final count performed by 2 team members:  Two Team Members   WING [address] Dr.      Final count correct?: Yes  Pre-Birth Team Brief: Complete  Post-Birth Team Debrief: Complete       Apgars    Living status: Living   1 Minute 5 Minute 10 Minute 15 Minute 20 Minute   Skin color: 0  1       Heart rate: 2  2       Reflex irritability: 2  2       Muscle tone: 2  2       Respiratory effort: 2  2       Total: 8  9       Apgars assigned by: AUNDREA INTERIANO RN       Cord      Cord Complications: Nuchal   Nuchal Intervention: reduced         Nuchal cord description: loose nuchal cord         Cord Blood Disposition: Discard    Gases Sent?: No    Delayed cord clamping?: Yes    Cord Clamping Delay (seconds):  seconds    Stem cell collection?: No           [place] Resuscitation    Methods: None       Delivery (Maternal) (Provider to Complete) ([id number])    Episiotomy: None  Perineal lacerations: None    Repair suture: None  Genital tract inspection done: Pos       Blood Loss  Mother: Andressa Patterson #[id number]     Start of Mother's  Information      Delivery Blood Loss   Intrapartum & Postpartum: 01/16/25 1630 - 01/16/25 2035    Delivery Admission: 01/16/25 1619 - 01/16/25 2035         Intrapartum & Postpartum Delivery Admission    Delivery QBL (mL) Hospital Encounter 15 mL 15 mL    Total  15 mL 15 mL               End of Mother's Information  Mother: Andressa Patterson #[id number]                Delivery - Provider to Complete ([id number])    Delivering clinician: Curly Guevara MD  Delivery Type (Choose the 1 that will go to the Birth History): Vaginal, Spontaneous                         Other personnel:  Provider Role   Iman Urrutia, RN Delivery Nurse   Saúl Taylor RN Delivery Assist                    Placenta    Date/Time: 1/16/2025  8:17 PM  Removal: Spontaneous  Comments: intact, 3 vesel cord  Disposition: Hospital disposal             Anesthesia    Method: Epidural  Cervical dilation at placement: 4-7                    Presentation and Position    Presentation: Vertex    Position: Right Occiput Anterior                     Curly Guevara MD

## 2025-01-17 NOTE — PROVIDER NOTIFICATION
01/16/25 1926   Provider Notification   Provider Name/Title Dr. Guevara   Method of Notification In Department     MD updated on SVE 7/90/-1 and feeing constant pressure.

## 2025-01-18 NOTE — DISCHARGE SUMMARY
[facility] OB Postpartum/Discharge Note    S:  Patient without complaints.  Has decided she would like to discharge now rather than wait until AM.    O:  Blood pressure 133/68, pulse 79, temperature 97.8  F (36.6  C), temperature source Oral, resp. rate 15, last menstrual period 04/26/2024, unknown if currently breastfeeding.        Exam deferred    A:   Postpartum Day# 1, s/p Vaginal delivery - doing well         GDM A1    P:  1)  Discharge home        2)  F/U 6 weeks w/ Primary OB, 2hr GTT at that time.        3)  Discharge meds: OTC Ibuprofen    Cristo Liu MD

## 2025-01-18 NOTE — PROVIDER NOTIFICATION
01/17/25 2158   Provider Notification   Provider Name/Title Dr. Liu   Method of Notification Phone   Request Evaluate-Remote   Notification Reason Other     Requested for provider to put discharge orders in for infant.

## 2025-01-18 NOTE — DISCHARGE INSTRUCTIONS
Warning Signs after Having a Baby    Keep this paper on your fridge or somewhere else where you can see it.    Call your provider if you have any of these symptoms up to 12 weeks after having your baby.    Thoughts of hurting yourself or your baby  Pain in your chest or trouble breathing  Severe headache not helped by pain medicine  Eyesight concerns (blurry vision, seeing spots or flashes of light, other changes to eyesight)  Fainting, shaking or other signs of a seizure    Call 9-1-1 if you feel that it is an emergency.     The symptoms below can happen to anyone after giving birth. They can be very serious. Call your provider if you have any of these warning signs.    My provider s phone number: _______________________    Losing too much blood (hemorrhage)    Call your provider if you soak through a pad in less than an hour or pass blood clots bigger than a golf ball. These may be signs that you are bleeding too much.    Blood clots in the legs or lungs    After you give birth, your body naturally clots its blood to help prevent blood loss. Sometimes this increased clotting can happen in other areas of the body, like the legs or lungs. This can block your blood flow and be very dangerous.     Call your provider if you:  Have a red, swollen spot on the back of your leg that is warm or painful when you touch it.   Are coughing up blood.     Infection    Call your provider if you have any of these symptoms:  Fever of 100.4 F (38 C) or higher.  Pain or redness around your stitches if you had an incision.   Any yellow, white, or green fluid coming from places where you had stitches or surgery.    Mood Problems (postpartum depression)    Many people feel sad or have mood changes after having a baby. But for some people, these mood swings are worse.     Call your provider right away if you feel so anxious or nervous that you can't care for yourself or your baby.    Preeclampsia (high blood pressure)    Even if you  didn't have high blood pressure when you were pregnant, you are at risk for the high blood pressure disease called preeclampsia. This risk can last up to 12 weeks after giving birth.     Call your provider if you have:   Pain on your right side under your rib cage  Sudden swelling in the hands and face    Remember: You know your body. If something doesn't feel right, get medical help.     For informational purposes only. Not to replace the advice of your health care provider. Copyright 2020 [facility]. All rights reserved. Clinically reviewed by Gabrielle Batres, RNC-OB, MSN. [employer] [id number] - Rev 02/23.

## 2025-01-18 NOTE — PROVIDER NOTIFICATION
01/17/25 2000   Provider Notification   Provider Name/Title Dr. Liu   Method of Notification Phone   Request Evaluate-Remote   Notification Reason Other     Notified provider of pt wanting to discharge tonight pending infants 24 hour testing results. Provider will put in discharge orders once results come back.

## 2025-01-18 NOTE — PLAN OF CARE
VSS on room air. Fundus/lochia WDL. Voiding appropriately and ambulating independently. Pain adequately controlled with PRN medications. Breastfeeding infant independently q2-3hr.     Birth Certificate and PP depression screen received. Education completed and AVS/discharge paperwork reviewed.  Patient indicates understanding discharge instructions. Questions answered, concerns addressed, resources provided. Verbalizes when to return to clinic for follow up for herself and infant.  Prescriptions reviewed. Staff escorted patient and infant off unit with AVS/discharge paperwork and personal belongings @2250.     Problem: Adult Inpatient Plan of Care  Goal: Plan of Care Review  Description: The Plan of Care Review/Shift note should be completed every shift.  The Outcome Evaluation is a brief statement about your assessment that the patient is improving, declining, or no change.  This information will be displayed automatically on your shift  note.  Outcome: Met  Flowsheets (Taken 1/17/2025 2259)  Plan of Care Reviewed With: patient  Overall Patient Progress: improving  Goal: Absence of Hospital-Acquired Illness or Injury  Intervention: Prevent Skin Injury  Recent Flowsheet Documentation  Taken 1/17/2025 2218 by Meche Dockery RN  Body Position: position changed independently  Intervention: Prevent and Manage VTE (Venous Thromboembolism) Risk  Recent Flowsheet Documentation  Taken 1/17/2025 2218 by Meche Dockery RN  VTE Prevention/Management: SCDs off (sequential compression devices)  Intervention: Prevent Infection  Recent Flowsheet Documentation  Taken 1/17/2025 2218 by Meche Dockery RN  Infection Prevention:   rest/sleep promoted   hand hygiene promoted  Goal: Optimal Comfort and Wellbeing  Intervention: Provide Person-Centered Care  Recent Flowsheet Documentation  Taken 1/17/2025 2218 by Meche Dockery RN  Trust Relationship/Rapport:   care explained   choices provided   questions encouraged   thoughts/feelings  acknowledged     Problem: Labor  Goal: Stable Fetal Wellbeing  Intervention: Promote and Monitor Fetal Wellbeing  Recent Flowsheet Documentation  Taken 1/17/2025 2218 by Meche Dockery RN  Body Position: position changed independently  Goal: Absence of Infection Signs and Symptoms  Intervention: Prevent or Manage Infection  Recent Flowsheet Documentation  Taken 1/17/2025 2218 by Meche Dockery RN  Infection Prevention:   rest/sleep promoted   hand hygiene promoted   Goal Outcome Evaluation:      Plan of Care Reviewed With: patient    Overall Patient Progress: improvingOverall Patient Progress: improving

## 2025-01-18 NOTE — PLAN OF CARE
"Pt's vitals stable. Fundus firm and midline. Denies difficulty voiding. Cramping pain controlled with oral medications. Independent with self and infant cares. Breastfeeding .     Problem: Adult Inpatient Plan of Care  Goal: Plan of Care Review  Description: The Plan of Care Review/Shift note should be completed every shift.  The Outcome Evaluation is a brief statement about your assessment that the patient is improving, declining, or no change.  This information will be displayed automatically on your shift  note.  Outcome: Progressing  Flowsheets (Taken 2025 180)  Outcome Evaluation: Pt stable.  Plan of Care Reviewed With: patient  Overall Patient Progress: improving  Goal: Patient-Specific Goal (Individualized)  Description: You can add care plan individualizations to a care plan. Examples of Individualization might be:  \"Parent requests to be called daily at 9am for status\", \"I have a hard time hearing out of my right ear\", or \"Do not touch me to wake me up as it startles  me\".  Outcome: Progressing  Goal: Absence of Hospital-Acquired Illness or Injury  Outcome: Progressing  Intervention: Prevent Skin Injury  Recent Flowsheet Documentation  Taken 2025 by Dorota Randhawa RN  Body Position: position changed independently  Taken 2025 by Dorota Randhawa RN  Body Position: position changed independently  Intervention: Prevent and Manage VTE (Venous Thromboembolism) Risk  Recent Flowsheet Documentation  Taken 2025 by Dorota Randhawa RN  VTE Prevention/Management: SCDs off (sequential compression devices)  Intervention: Prevent Infection  Recent Flowsheet Documentation  Taken 2025 by Dorota Randhawa, RN  Infection Prevention:   hand hygiene promoted   rest/sleep promoted  Goal: Optimal Comfort and Wellbeing  Outcome: Progressing  Intervention: Monitor Pain and Promote Comfort  Recent Flowsheet Documentation  Taken 2025 by Dorota Randhawa, " RN  Pain Management Interventions: medication (see MAR)  Taken 1/17/2025 1150 by Dorota Randhawa RN  Pain Management Interventions: medication (see MAR)  Taken 1/17/2025 0739 by Dorota Randhawa RN  Pain Management Interventions: medication (see MAR)  Intervention: Provide Person-Centered Care  Recent Flowsheet Documentation  Taken 1/17/2025 1557 by Dorota Randhawa, RN  Trust Relationship/Rapport:   care explained   choices provided   questions answered   questions encouraged   reassurance provided  Taken 1/17/2025 0789 by Dorota Randhawa, RN  Trust Relationship/Rapport:   care explained   choices provided   questions answered   questions encouraged   reassurance provided  Goal: Readiness for Transition of Care  Outcome: Progressing     Goal Outcome Evaluation:      Plan of Care Reviewed With: patient    Overall Patient Progress: improving    Outcome Evaluation: Pt stable.

## (undated) DEVICE — PREP CHLORAPREP 10.5ML CLR

## (undated) DEVICE — BAG CLEAR TRASH 1.3M 39X33" P4040C

## (undated) DEVICE — SUCTION CANISTER MEDIVAC LINER 3000ML W/LID 65651-530

## (undated) DEVICE — LINEN HALF SHEET 5512

## (undated) DEVICE — SUCTION IRR STRYKERFLOW II W/TIP 250-070-520

## (undated) DEVICE — PAD PERI INDIV WRAP 11" 2022A

## (undated) DEVICE — PAD CHUX UNDERPAD 30X36" P3036C

## (undated) DEVICE — SU MONOCRYL 4-0 PS-2 27" UND Y426H

## (undated) DEVICE — ESU CORD MONOPOLAR 10'  E0510

## (undated) DEVICE — Device

## (undated) DEVICE — ESU GROUND PAD ADULT W/CORD E7507

## (undated) DEVICE — SOL NACL 0.9% IRRIG 3000ML BAG 2B7477

## (undated) DEVICE — GLOVE PROTEXIS BLUE W/NEU-THERA 7.0  2D73EB70

## (undated) DEVICE — BLADE KNIFE SURG 11 371111

## (undated) DEVICE — DRSG STERI STRIP 1/2X4" R1547

## (undated) DEVICE — ESU PENCIL W/HOLSTER E2350H

## (undated) DEVICE — LINEN TOWEL PACK X10 5473

## (undated) DEVICE — LINEN FULL SHEET 5511

## (undated) DEVICE — ENDO POUCH UNIV RETRIEVAL SYSTEM INZII 10MM CD001

## (undated) DEVICE — SOL NACL 0.9% IRRIG 1000ML BOTTLE 2F7124

## (undated) DEVICE — CATH TRAY FOLEY SURESTEP 16FR DRAIN BAG STATOCK A899916

## (undated) DEVICE — ESU HANDPIECE BIPOLAR THUNDERBEAT FC 5MMX35CM TB-0535FC

## (undated) DEVICE — SU VICRYL 0 UR-6 27" J603H

## (undated) DEVICE — SURGICEL POWDER ABSORBABLE HEMOSTAT 3GM 3013SP

## (undated) DEVICE — PREP TECHNI-CARE CHLOROXYLENOL 3% 4OZ BOTTLE C222-4ZWO

## (undated) DEVICE — PACK GYN LAPAROSCOPY RIDGES

## (undated) DEVICE — GLOVE PROTEXIS MICRO 6.5  2D73PM65

## (undated) DEVICE — DRAPE MAYO STAND 23X54 8337

## (undated) DEVICE — TROCAR TRIPORT 15 OLYMPUS SINGLE ACCESS WA58015T

## (undated) DEVICE — LINEN POUCH DBL 5427

## (undated) RX ORDER — HYDROMORPHONE HYDROCHLORIDE 1 MG/ML
INJECTION, SOLUTION INTRAMUSCULAR; INTRAVENOUS; SUBCUTANEOUS
Status: DISPENSED
Start: 2020-02-14

## (undated) RX ORDER — BUPIVACAINE HYDROCHLORIDE 5 MG/ML
INJECTION, SOLUTION EPIDURAL; INTRACAUDAL
Status: DISPENSED
Start: 2020-02-14

## (undated) RX ORDER — FENTANYL CITRATE 50 UG/ML
INJECTION, SOLUTION INTRAMUSCULAR; INTRAVENOUS
Status: DISPENSED
Start: 2020-02-14

## (undated) RX ORDER — PROPOFOL 10 MG/ML
INJECTION, EMULSION INTRAVENOUS
Status: DISPENSED
Start: 2020-02-14

## (undated) RX ORDER — KETOROLAC TROMETHAMINE 30 MG/ML
INJECTION, SOLUTION INTRAMUSCULAR; INTRAVENOUS
Status: DISPENSED
Start: 2020-02-14

## (undated) RX ORDER — ETOMIDATE 2 MG/ML
INJECTION INTRAVENOUS
Status: DISPENSED
Start: 2020-02-14

## (undated) RX ORDER — HYDROCODONE BITARTRATE AND ACETAMINOPHEN 5; 325 MG/1; MG/1
TABLET ORAL
Status: DISPENSED
Start: 2020-02-14

## (undated) RX ORDER — NEOSTIGMINE METHYLSULFATE 1 MG/ML
VIAL (ML) INJECTION
Status: DISPENSED
Start: 2020-02-14

## (undated) RX ORDER — GLYCOPYRROLATE 0.2 MG/ML
INJECTION INTRAMUSCULAR; INTRAVENOUS
Status: DISPENSED
Start: 2020-02-14